# Patient Record
Sex: FEMALE | Race: WHITE | NOT HISPANIC OR LATINO | Employment: FULL TIME | URBAN - METROPOLITAN AREA
[De-identification: names, ages, dates, MRNs, and addresses within clinical notes are randomized per-mention and may not be internally consistent; named-entity substitution may affect disease eponyms.]

---

## 2017-09-01 ENCOUNTER — LAB (OUTPATIENT)
Dept: LAB | Facility: CLINIC | Age: 27
End: 2017-09-01
Payer: COMMERCIAL

## 2017-09-01 ENCOUNTER — TRANSCRIBE ORDERS (OUTPATIENT)
Dept: LAB | Facility: CLINIC | Age: 27
End: 2017-09-01

## 2017-09-01 ENCOUNTER — ALLSCRIPTS OFFICE VISIT (OUTPATIENT)
Dept: OTHER | Facility: OTHER | Age: 27
End: 2017-09-01

## 2017-09-01 DIAGNOSIS — S00.86XA NONVENOMOUS INSECT BITE OF FACE WITHOUT INFECTION, INITIAL ENCOUNTER: Primary | ICD-10-CM
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
## 2017-09-01 DIAGNOSIS — S00.86XA NONVENOMOUS INSECT BITE OF FACE WITHOUT INFECTION, INITIAL ENCOUNTER: ICD-10-CM
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
## 2017-09-01 PROCEDURE — G0145 SCR C/V CYTO,THINLAYER,RESCR: HCPCS | Performed by: NURSE PRACTITIONER

## 2017-09-01 PROCEDURE — 36415 COLL VENOUS BLD VENIPUNCTURE: CPT

## 2017-09-06 ENCOUNTER — LAB REQUISITION (OUTPATIENT)
Dept: LAB | Facility: HOSPITAL | Age: 27
End: 2017-09-06
Payer: COMMERCIAL

## 2017-09-06 DIAGNOSIS — Z01.411 ENCOUNTER FOR GYNECOLOGICAL EXAMINATION WITH ABNORMAL FINDING: ICD-10-CM

## 2017-09-12 LAB
LAB AP GYN PRIMARY INTERPRETATION: NORMAL
LAB AP LMP: NORMAL
Lab: NORMAL

## 2017-09-13 ENCOUNTER — GENERIC CONVERSION - ENCOUNTER (OUTPATIENT)
Dept: OTHER | Facility: OTHER | Age: 27
End: 2017-09-13

## 2017-10-07 LAB — MISCELLANEOUS LAB TEST RESULT: NORMAL

## 2017-10-09 ENCOUNTER — GENERIC CONVERSION - ENCOUNTER (OUTPATIENT)
Dept: OTHER | Facility: OTHER | Age: 27
End: 2017-10-09

## 2017-10-09 ENCOUNTER — APPOINTMENT (OUTPATIENT)
Dept: LAB | Facility: CLINIC | Age: 27
End: 2017-10-09
Payer: COMMERCIAL

## 2017-10-09 ENCOUNTER — ALLSCRIPTS OFFICE VISIT (OUTPATIENT)
Dept: OTHER | Facility: OTHER | Age: 27
End: 2017-10-09

## 2017-10-09 DIAGNOSIS — Z13.6 ENCOUNTER FOR SCREENING FOR CARDIOVASCULAR DISORDERS: ICD-10-CM

## 2017-10-09 DIAGNOSIS — R53.83 OTHER FATIGUE: ICD-10-CM

## 2017-10-09 LAB
ALBUMIN SERPL BCP-MCNC: 4.3 G/DL (ref 3.5–5)
ALP SERPL-CCNC: 40 U/L (ref 46–116)
ALT SERPL W P-5'-P-CCNC: 18 U/L (ref 12–78)
ANION GAP SERPL CALCULATED.3IONS-SCNC: 4 MMOL/L (ref 4–13)
AST SERPL W P-5'-P-CCNC: 15 U/L (ref 5–45)
BASOPHILS # BLD AUTO: 0.01 THOUSANDS/ΜL (ref 0–0.1)
BASOPHILS NFR BLD AUTO: 0 % (ref 0–1)
BILIRUB SERPL-MCNC: 1.38 MG/DL (ref 0.2–1)
BUN SERPL-MCNC: 15 MG/DL (ref 5–25)
CALCIUM SERPL-MCNC: 9.2 MG/DL (ref 8.3–10.1)
CHLORIDE SERPL-SCNC: 106 MMOL/L (ref 100–108)
CHOLEST SERPL-MCNC: 135 MG/DL (ref 50–200)
CO2 SERPL-SCNC: 30 MMOL/L (ref 21–32)
CREAT SERPL-MCNC: 0.85 MG/DL (ref 0.6–1.3)
EOSINOPHIL # BLD AUTO: 0.15 THOUSAND/ΜL (ref 0–0.61)
EOSINOPHIL NFR BLD AUTO: 4 % (ref 0–6)
ERYTHROCYTE [DISTWIDTH] IN BLOOD BY AUTOMATED COUNT: 12.4 % (ref 11.6–15.1)
GFR SERPL CREATININE-BSD FRML MDRD: 93 ML/MIN/1.73SQ M
GLUCOSE P FAST SERPL-MCNC: 78 MG/DL (ref 65–99)
HCT VFR BLD AUTO: 45 % (ref 34.8–46.1)
HDLC SERPL-MCNC: 56 MG/DL (ref 40–60)
HGB BLD-MCNC: 14.6 G/DL (ref 11.5–15.4)
LDLC SERPL CALC-MCNC: 66 MG/DL (ref 0–100)
LYMPHOCYTES # BLD AUTO: 1.51 THOUSANDS/ΜL (ref 0.6–4.47)
LYMPHOCYTES NFR BLD AUTO: 36 % (ref 14–44)
MCH RBC QN AUTO: 31.1 PG (ref 26.8–34.3)
MCHC RBC AUTO-ENTMCNC: 32.4 G/DL (ref 31.4–37.4)
MCV RBC AUTO: 96 FL (ref 82–98)
MONOCYTES # BLD AUTO: 0.32 THOUSAND/ΜL (ref 0.17–1.22)
MONOCYTES NFR BLD AUTO: 8 % (ref 4–12)
NEUTROPHILS # BLD AUTO: 2.19 THOUSANDS/ΜL (ref 1.85–7.62)
NEUTS SEG NFR BLD AUTO: 52 % (ref 43–75)
NRBC BLD AUTO-RTO: 0 /100 WBCS
PLATELET # BLD AUTO: 313 THOUSANDS/UL (ref 149–390)
PMV BLD AUTO: 10.7 FL (ref 8.9–12.7)
POTASSIUM SERPL-SCNC: 4.2 MMOL/L (ref 3.5–5.3)
PROT SERPL-MCNC: 7.9 G/DL (ref 6.4–8.2)
RBC # BLD AUTO: 4.69 MILLION/UL (ref 3.81–5.12)
SODIUM SERPL-SCNC: 140 MMOL/L (ref 136–145)
TRIGL SERPL-MCNC: 66 MG/DL
TSH SERPL DL<=0.05 MIU/L-ACNC: 1.06 UIU/ML (ref 0.36–3.74)
WBC # BLD AUTO: 4.19 THOUSAND/UL (ref 4.31–10.16)

## 2017-10-09 PROCEDURE — 80061 LIPID PANEL: CPT

## 2017-10-09 PROCEDURE — 80053 COMPREHEN METABOLIC PANEL: CPT

## 2017-10-09 PROCEDURE — 85025 COMPLETE CBC W/AUTO DIFF WBC: CPT

## 2017-10-09 PROCEDURE — 36415 COLL VENOUS BLD VENIPUNCTURE: CPT

## 2017-10-09 PROCEDURE — 84443 ASSAY THYROID STIM HORMONE: CPT

## 2017-10-10 NOTE — PROGRESS NOTES
Assessment  1  Palpitations (785 1) (R00 2)   2  Overweight (278 02) (E66 3)   3  Fatigue (780 79) (R53 83)   4  Screening for cardiovascular condition (V81 2) (Z13 6)    Plan  Fatigue    · (1) CBC/PLT/DIFF; Status:Active; Requested EDH:[id number];    · (1) COMPREHENSIVE METABOLIC PANEL; Status:Active; Requested AZD:[id number];    · (1) TSH; Status:Active; Requested SBD:24RYJ2861; Overweight    · Keep a diary of when and what you eat ; Status:Complete;   Done: 07JYO1946   · Some eating tips that can help you lose weight ; Status:Complete;   Done: 71FJG6760   · We recommend that you bring your body mass index down to 26 ; Status:Complete;    Done: 91MCY8142  Screening for cardiovascular condition    · (1) LIPID PANEL, FASTING; Status:Active; Requested EAD:72ZVO9523;     Discussion/Summary  Discussion Summary:   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 or stressed at work, she has palpitations  She has never taken her pulse rate or blood pressure at that time  I told her to do so  She was also told to cut down on the caffeine intake and to try to relax  If her symptoms persist, she may need an EKG or cardiology consult has not had a doctor visit for the last 3 years  I will check a CBC and TSH level  was told to try to watch her diet and increase her activity  is planning her next pregnancy and wanted to know if everything is okay  I will get back to her with the results of the blood work  reviewed the old records  Counseling Documentation With Imm: The patient was counseled regarding diagnostic results,-instructions for management,-risks and benefits of treatment options,-importance of compliance with treatment  Chief Complaint  Chief Complaint Free Text Note Form: establish care      History of Present Illness  Obesity (Follow-Up): The patient is being seen for follow-up of overweight  The patient reports no change in the condition   She has had no significant interval events  The patient is currently asymptomatic  The patient is not currently on medication for this problem  Fatigue: The patient is being seen for an initial evaluation of fatigue  Symptoms:  fatigue  No associated symptoms are reported  The patient is not currently being treated for this problem  Palpitations:   Jason Joya presents with complaints of gradual onset of occasional episodes of moderate palpitations  Episodes about months ago  Symptoms are improved by stress management, adequate rest and caffeine avoidance  Symptoms are made worse by emotional stress, fatigue and caffeine  Associated symptoms include rapid heartbeat-and-a pounding heartbeat  Review of Systems  Complete-Female:   Constitutional: No fever, no chills, feels well, no tiredness, no recent weight gain or weight loss  Eyes: No complaints of eye pain, no red eyes, no eyesight problems, no discharge, no dry eyes, no itching of eyes  ENT: no complaints of earache, no loss of hearing, no nose bleeds, no nasal discharge, no sore throat, no hoarseness  Cardiovascular: palpitations  Respiratory: No complaints of shortness of breath, no wheezing, no cough, no SOB on exertion, no orthopnea, no PND  Gastrointestinal: No complaints of abdominal pain, no constipation, no nausea or vomiting, no diarrhea, no bloody stools  Genitourinary: No complaints of dysuria, no incontinence, no pelvic pain, no dysmenorrhea, no vaginal discharge or bleeding  Musculoskeletal: No complaints of arthralgias, no myalgias, no joint swelling or stiffness, no limb pain or swelling  Integumentary: No complaints of skin rash or lesions, no itching, no skin wounds, no breast pain or lump  Neurological: No complaints of headache, no confusion, no convulsions, no numbness, no dizziness or fainting, no tingling, no limb weakness, no difficulty walking     Psychiatric: Not suicidal, no sleep disturbance, no anxiety or depression, no change in personality, no emotional problems  Endocrine: No complaints of proptosis, no hot flashes, no muscle weakness, no deepening of the voice, no feelings of weakness  Hematologic/Lymphatic: No complaints of swollen glands, no swollen glands in the neck, does not bleed easily, does not bruise easily  Active Problems  1  Encounter for gynecological examination with abnormal finding (V72 31) (Z01 411)    Past Medical History  1  History of Mosquito bite (919 4,E906 4) [facility])  Active Problems And Past Medical History Reviewed: The active problems and past medical history were reviewed and updated today  Surgical History  1  History of  Section  Surgical History Reviewed: The surgical history was reviewed and updated today  Family History  Father    1  Family history of malignant neoplasm (V16 9) (Z80 9)  Paternal Grandmother    2  Family history of arthritis (V17 7) (Z82 61)   3  Family history of diabetes mellitus (V18 0) (Z83 3)   4  Family history of hypertension (V17 49) (Z82 49)   5  Family history of respiratory disorder (V17 6) (Z83 6)   6  Family history of urinary tract infection (V18 7) (Z84 2)   7  Family history of varicose veins (V17 49) (Z82 49)  Paternal Grandfather    8  Family history of hypertension (V17 49) (Z82 49)  Family History Reviewed: The family history was reviewed and updated today  Social History   · Currently sexually active   · Former smoker (R29 84) (C91 432)   · No alcohol use  Social History Reviewed: The social history was reviewed and updated today  The social history was reviewed and is unchanged  Current Meds   1  Prenatal 28-0 8 MG Oral Tablet; TAKE 1 TABLET DAILY; Therapy: 69WDF9283 to Recorded    Allergies  1   No Known Drug Allergies    Vitals  Signs   Recorded: 60LSG1636 09:53AM   Heart Rate: 84  Respiration: 16  Systolic: 225  Diastolic: 78  Height: 5 ft 4 5 in  Weight: 162 lb   BMI Calculated: 27 38  BSA Calculated: 1 8    Physical Exam    Constitutional   General appearance: Abnormal   overweight  Head and Face   Head and face: Normal     Palpation of the face and sinuses: No sinus tenderness  Eyes   Conjunctiva and lids: No swelling, erythema or discharge  Pupils and irises: Equal, round, reactive to light  Ears, Nose, Mouth, and Throat   External inspection of ears and nose: Normal     Otoscopic examination: Tympanic membranes translucent with normal light reflex  Canals patent without erythema  Oropharynx: Normal with no erythema, edema, exudate or lesions  Neck   Neck: Supple, symmetric, trachea midline, no masses  Thyroid: Abnormal   The thyroid was diffusely enlarged  Pulmonary   Respiratory effort: No increased work of breathing or signs of respiratory distress  Auscultation of lungs: Clear to auscultation  Cardiovascular   Palpation of heart: Normal PMI, no thrills  Auscultation of heart: Normal rate and rhythm, normal S1 and S2, no murmurs  Examination of extremities for edema and/or varicosities: Normal     Abdomen   Abdomen: Non-tender, no masses  Liver and spleen: No hepatomegaly or splenomegaly  Lymphatic   Palpation of lymph nodes in neck: No lymphadenopathy  Musculoskeletal   Gait and station: Normal     Skin   Skin and subcutaneous tissue: Normal without rashes or lesions  Neurologic   Cranial nerves: Cranial nerves II-XII intact  Cortical function: Normal mental status  Reflexes: 2+ and symmetric  Sensation: No sensory loss      Psychiatric   Judgment and insight: Normal     Mood and affect: Normal        Future Appointments    Date/Time Provider Specialty Site   09/07/2018 04:00 PM LINDA Varner Obstetrics/Gynecology [facility]     Signatures   Electronically signed by : CECILIA Triana ; Oct  9 2017 10:25AM EST                       (Author)

## 2018-01-12 ENCOUNTER — APPOINTMENT (OUTPATIENT)
Dept: LAB | Facility: CLINIC | Age: 28
End: 2018-01-12
Payer: COMMERCIAL

## 2018-01-12 ENCOUNTER — ALLSCRIPTS OFFICE VISIT (OUTPATIENT)
Dept: OTHER | Facility: OTHER | Age: 28
End: 2018-01-12

## 2018-01-12 VITALS
WEIGHT: 168 LBS | HEIGHT: 65 IN | SYSTOLIC BLOOD PRESSURE: 120 MMHG | BODY MASS INDEX: 27.99 KG/M2 | DIASTOLIC BLOOD PRESSURE: 80 MMHG

## 2018-01-12 DIAGNOSIS — Z34.90 ENCOUNTER FOR SUPERVISION OF NORMAL PREGNANCY: ICD-10-CM

## 2018-01-12 LAB
BACTERIA UR QL AUTO: ABNORMAL /HPF
BASOPHILS # BLD AUTO: 0.01 THOUSANDS/ΜL (ref 0–0.1)
BASOPHILS NFR BLD AUTO: 0 % (ref 0–1)
BILIRUB UR QL STRIP: ABNORMAL
CAOX CRY URNS QL MICRO: ABNORMAL /HPF
CLARITY UR: ABNORMAL
COLOR UR: YELLOW
EOSINOPHIL # BLD AUTO: 0.09 THOUSAND/ΜL (ref 0–0.61)
EOSINOPHIL NFR BLD AUTO: 1 % (ref 0–6)
ERYTHROCYTE [DISTWIDTH] IN BLOOD BY AUTOMATED COUNT: 12.7 % (ref 11.6–15.1)
GLUCOSE UR STRIP-MCNC: NEGATIVE MG/DL
HCT VFR BLD AUTO: 43.2 % (ref 34.8–46.1)
HGB BLD-MCNC: 14.7 G/DL (ref 11.5–15.4)
HGB UR QL STRIP.AUTO: NEGATIVE
KETONES UR STRIP-MCNC: ABNORMAL MG/DL
LEUKOCYTE ESTERASE UR QL STRIP: NEGATIVE
LYMPHOCYTES # BLD AUTO: 1.63 THOUSANDS/ΜL (ref 0.6–4.47)
LYMPHOCYTES NFR BLD AUTO: 20 % (ref 14–44)
MCH RBC QN AUTO: 31.2 PG (ref 26.8–34.3)
MCHC RBC AUTO-ENTMCNC: 34 G/DL (ref 31.4–37.4)
MCV RBC AUTO: 92 FL (ref 82–98)
MONOCYTES # BLD AUTO: 0.56 THOUSAND/ΜL (ref 0.17–1.22)
MONOCYTES NFR BLD AUTO: 7 % (ref 4–12)
MUCOUS THREADS UR QL AUTO: ABNORMAL
NEUTROPHILS # BLD AUTO: 6 THOUSANDS/ΜL (ref 1.85–7.62)
NEUTS SEG NFR BLD AUTO: 72 % (ref 43–75)
NITRITE UR QL STRIP: NEGATIVE
NON-SQ EPI CELLS URNS QL MICRO: ABNORMAL /HPF
NRBC BLD AUTO-RTO: 0 /100 WBCS
PH UR STRIP.AUTO: 5.5 [PH] (ref 4.5–8)
PLATELET # BLD AUTO: 321 THOUSANDS/UL (ref 149–390)
PMV BLD AUTO: 10.4 FL (ref 8.9–12.7)
PROT UR STRIP-MCNC: NEGATIVE MG/DL
RBC # BLD AUTO: 4.71 MILLION/UL (ref 3.81–5.12)
RBC #/AREA URNS AUTO: ABNORMAL /HPF
SP GR UR STRIP.AUTO: 1.03 (ref 1–1.03)
UROBILINOGEN UR QL STRIP.AUTO: 0.2 E.U./DL
WBC # BLD AUTO: 8.32 THOUSAND/UL (ref 4.31–10.16)
WBC #/AREA URNS AUTO: ABNORMAL /HPF

## 2018-01-12 PROCEDURE — 87086 URINE CULTURE/COLONY COUNT: CPT

## 2018-01-12 PROCEDURE — 81001 URINALYSIS AUTO W/SCOPE: CPT

## 2018-01-12 PROCEDURE — 36415 COLL VENOUS BLD VENIPUNCTURE: CPT

## 2018-01-12 PROCEDURE — 80081 OBSTETRIC PANEL INC HIV TSTG: CPT

## 2018-01-12 NOTE — MISCELLANEOUS
Message   Recorded as Task   Date: 09/13/2017 09:50 AM, Created By: Radha García   Task Name: Care Coordination   Assigned To: [facility],Team   Regarding Patient: Brenda Joseph, Status: In Progress   Comment:    Jolie Camacho - 13 Sep 2017 9:50 AM     TASK CREATED  Caller: Dept  of Health, Consultant; Care Coordination  Department of Health called in regards to [address] not being covered for fatigue  The CDC recommendation is 6 months abstinence and condoms  Will need to ask if she is having any achy joints, rash, conjunctivitis, or sick  Did her  travel with her? Will need to verify with patient  LM to call and ask for OB nurse  I can then fill out paperwork for patient  Testing can cost out of pocket between $200-800 00  Will ask patient if she wants to proceed with testing  Jolie Camacho - 13 Sep 2017 9:59 AM     TASK IN PROGRESS   Jolie Camacho - 13 Sep 2017 12:38 PM     TASK EDITED  Please call department  health and let them know to get rid of Zika specimen  Patient does not want it done and does not want a bill  Mic Reyna - 13 Sep 2017 12:44 PM     TASK EDITED  Will abstain or use condoms x 2 months then try again  She has been trying and I told her to call with + HPT  We can talk about risks at that point  Please put this in note once you call  [facility] - 13 Sep 2017 12:45 PM     TASK IN PROGRESS   [place] [address] Sep 2017 12:51 PM     TASK EDITED  If sample is accepted by department of health for [address] testing it will be free of charge to the pt  They will receive charge if lab is run at independent lab  Active Problems    1  Encounter for gynecological examination with abnormal finding (V72 31) (Z01 411)   2  Mosquito bite (919 4,E906 4) (W57 XXXA)    Current Meds   1  No Reported Medications Recorded    Allergies    1   No Known Drug Allergies    Signatures   Electronically signed by : Herve Lam, ; Sep 13 2017 12:52PM EST (Author)

## 2018-01-13 ENCOUNTER — LAB REQUISITION (OUTPATIENT)
Dept: LAB | Facility: HOSPITAL | Age: 28
End: 2018-01-13
Payer: COMMERCIAL

## 2018-01-13 VITALS
WEIGHT: 162 LBS | HEART RATE: 84 BPM | RESPIRATION RATE: 16 BRPM | SYSTOLIC BLOOD PRESSURE: 110 MMHG | DIASTOLIC BLOOD PRESSURE: 78 MMHG | HEIGHT: 65 IN | BODY MASS INDEX: 26.99 KG/M2

## 2018-01-13 DIAGNOSIS — Z34.90 ENCOUNTER FOR SUPERVISION OF NORMAL PREGNANCY: ICD-10-CM

## 2018-01-13 LAB
BACTERIA UR CULT: NORMAL
BLD GP AB SCN SERPL QL: NEGATIVE
HBV SURFACE AG SER QL: NORMAL
RUBV IGG SERPL IA-ACNC: 96.1 IU/ML
SPECIMEN EXPIRATION DATE: NORMAL

## 2018-01-13 NOTE — PROGRESS NOTES
Assessment   1  Encounter to determine fetal viability of pregnancy (V23 87) (O36 80X0)    Plan   Unlinked    · Prenatal 28-0 8 MG Oral Tablet; TAKE 1 TABLET DAILY   Dispense: 0 Days ; #: Sufficient Tablet; Refill: 0; EDIE = N; Record; Last Updated By: Romaine Sellerson; 10/9/2017 9:59:56 AM    Discussion/Summary   Discussion Summary:    FRANCISCA 18 (LMP) by ultrasound- FRANCISCA by LMP to be used        Goals and Barriers: The patient has the current Goals: Healthy pregnancy  The patent has the current Barriers: None  Patient's Capacity to Self-Care: Patient is able to Self-Care  Patient Education: Educational resources provided: Food and nutrition during pregnancy  Medication SE Review and Pt Understands Tx: Possible side effects of new medications were reviewed with the patient/guardian today  The treatment plan was reviewed with the patient/guardian  The patient/guardian understands and agrees with the treatment plan    Self Referrals:    Self Referrals: No      Chief Complaint   Chief Complaint Free Text Note Form: Patient here today for Ultrasound  Doing well  LMP 17 = FRANCISCA 18      Active Problems   1  Fatigue (780 79) (R53 83)   2  Overweight (278 02) (E66 3)   3  Palpitations (785 1) (R00 2)   4  Screening for cardiovascular condition (V81 2) (Z13 6)    Past Medical History   1  History of Mosquito bite (919 4,E906 4) [facility])    Surgical History   1  History of  Section    Family History   Father    1  Family history of malignant neoplasm (V16 9) (Z80 9)  Paternal Grandmother    2  Family history of arthritis (V17 7) (Z82 61)   3  Family history of diabetes mellitus (V18 0) (Z83 3)   4  Family history of hypertension (V17 49) (Z82 49)   5  Family history of respiratory disorder (V17 6) (Z83 6)   6  Family history of urinary tract infection (V18 7) (Z84 2)   7  Family history of varicose veins (V17 49) (Z82 49)  Paternal Grandfather    8   Family history of hypertension (V17 49) (Z82 49)    Social History    · Currently sexually active   · Former smoker (V15 82) (B21 285)   · No alcohol use    Current Meds    1  Prenatal 28-0 8 MG Oral Tablet; TAKE 1 TABLET DAILY; Therapy: 37LBK6836 to Recorded    Allergies   1  No Known Drug Allergies    Vitals   Vital Signs    Recorded: 86UAO9911 44:53DN   Systolic 106   Diastolic 62   Height 5 ft 4 5 in   Weight 169 lb    BMI Calculated 28 56   BSA Calculated 1 83   LMP 10JNQ7056     Results/Data   [address] Transvaginal Ultrasound OB [facility]:      Procedure: [id number] pregnant uterus real time with image documentation, fetal and maternal evaluation, first trimester (<14 weeks 0 days), transvaginal approach: single or first gestation  Indication: EDC gestational age 8w 3 weeks  Exam indication: viability  Findings:      Number of gestational sacs and fetuses: 1  Gestational sac/fetal measurements appropriate for gestation: CRL 4 19cm 11w1d 8/2/18  Survey of visible fetal and placental anatomic structure FHt 161 fetal movement seen all extremities  Qualitative assessment of amniotic fluid volume/gestational sac shape: nl appearing  Exam of maternal uterus and adnexa: nl appearing  Impression: viable IUP agrees with LMP FRANCISCA        Future Appointments      Date/Time Provider Specialty Site   09/07/2018 04:00 PM LINDA Brown Obstetrics/Gynecology [facility]     Signatures    Electronically signed by : Paul Barriga MD; Jan 12 2018  2:36PM EST                       (Author)

## 2018-01-14 NOTE — RESULT NOTES
Verified Results  (1) THIN PREP PAP WITH IMAGING 25Nxw2104 11:23AM Keri Uribe     Test Name Result Flag Reference   LAB AP CASE REPORT (Report)     Gynecologic Cytology Report            Case: [id number]                  Authorizing Provider: LINDA Rueda    Collected:      09/01/2017           First Screen:     LAURIE Schulz    Received:      09/07/2017 0750        Specimen:  LIQUID-BASED PAP, SCREENING, Cervix   LAB AP GYN PRIMARY INTERPRETATION      Negative for intraepithelial lesion or malignancy  Electronically signed by LAURIE Schulz on 9/12/2017 at 11:23 AM   LAB AP GYN SPECIMEN ADEQUACY      Satisfactory for evaluation  Endocervical/transformation zone component present  LAB AP GYN ADDITIONAL INFORMATION (Report)     [employer]'s FDA approved ,  and ThinPrep Imaging System are   utilized with strict adherence to the 's instruction manual to   prepare gynecologic and non-gynecologic cytology specimens for the   production of ThinPrep slides as well as for gynecologic ThinPrep imaging  These processes have been validated by our laboratory and/or by the     The Pap test is not a diagnostic procedure and should not be used as the   sole means to detect cervical cancer  It is only a screening procedure to   aid in the detection of cervical cancer and its precursors  Both   false-negative and false-positive results have been experienced  Your   patient's test result should be interpreted in this context together with   the history and clinical findings     LAB AP LMP 8/5/2017

## 2018-01-15 ENCOUNTER — ALLSCRIPTS OFFICE VISIT (OUTPATIENT)
Dept: OTHER | Facility: OTHER | Age: 28
End: 2018-01-15

## 2018-01-15 ENCOUNTER — LAB REQUISITION (OUTPATIENT)
Dept: LAB | Facility: HOSPITAL | Age: 28
End: 2018-01-15
Payer: COMMERCIAL

## 2018-01-15 DIAGNOSIS — Z34.90 ENCOUNTER FOR SUPERVISION OF NORMAL PREGNANCY: ICD-10-CM

## 2018-01-15 LAB
ABO GROUP BLD: NORMAL
HIV 1+2 AB+HIV1 P24 AG SERPL QL IA: NORMAL
RH BLD: POSITIVE
RPR SER QL: NORMAL

## 2018-01-15 PROCEDURE — 86901 BLOOD TYPING SEROLOGIC RH(D): CPT | Performed by: OBSTETRICS & GYNECOLOGY

## 2018-01-15 PROCEDURE — 86900 BLOOD TYPING SEROLOGIC ABO: CPT | Performed by: OBSTETRICS & GYNECOLOGY

## 2018-01-16 NOTE — MISCELLANEOUS
Message   Recorded as Task   Date: 10/09/2017 12:51 PM, Created By: Moises Garcia   Task Name: Follow Up   Assigned To: Demond Marti   Regarding Patient: Brenda Joseph, Status: In Progress   Comment:    Yudi Ovalle - 09 Oct 2017 12:51 PM     TASK CREATED  Please notify patient the Department of Health did not improve her Zika testing So it was not processed,thx   Karla Renteria - 09 Oct 2017 1:47 PM     TASK IN PROGRESS   Karla Renteria - 09 Oct 2017 1:56 PM     TASK EDITED  MA called Pt today @ [phone], left voicemail message per Pt's communication consent form signed by Pt on 8/11/17  MA informed Pt, via voicemail message, that [facility] did not josh approval for Pt's [employer] testing therefore request was not processed  MA reiterated to Pt that if Pt has any questions/concerns, to contact office  Active Problems    1  Encounter for gynecological examination with abnormal finding (V72 31) (Z01 411)   2  Fatigue (780 79) (R53 83)   3  Overweight (278 02) (E66 3)   4  Palpitations (785 1) (R00 2)   5  Screening for cardiovascular condition (V81 2) (Z13 6)    Current Meds   1  Prenatal 28-0 8 MG Oral Tablet; TAKE 1 TABLET DAILY; Therapy: 27GNE5846 to Recorded    Allergies    1   No Known Drug Allergies    Signatures   Electronically signed by : Sandra Pritchett MA; Oct  9 2017  1:56PM EST                       (Author)

## 2018-01-22 VITALS
BODY MASS INDEX: 28.16 KG/M2 | WEIGHT: 169 LBS | HEIGHT: 65 IN | DIASTOLIC BLOOD PRESSURE: 62 MMHG | SYSTOLIC BLOOD PRESSURE: 118 MMHG

## 2018-01-23 ENCOUNTER — OB ABSTRACT (OUTPATIENT)
Dept: OBGYN CLINIC | Facility: MEDICAL CENTER | Age: 28
End: 2018-01-23

## 2018-01-23 VITALS
SYSTOLIC BLOOD PRESSURE: 102 MMHG | WEIGHT: 173.6 LBS | HEIGHT: 65 IN | BODY MASS INDEX: 28.92 KG/M2 | DIASTOLIC BLOOD PRESSURE: 68 MMHG

## 2018-01-26 ENCOUNTER — ROUTINE PRENATAL (OUTPATIENT)
Dept: OBGYN CLINIC | Facility: MEDICAL CENTER | Age: 28
End: 2018-01-26

## 2018-01-26 VITALS — SYSTOLIC BLOOD PRESSURE: 100 MMHG | WEIGHT: 174 LBS | DIASTOLIC BLOOD PRESSURE: 60 MMHG | BODY MASS INDEX: 29.41 KG/M2

## 2018-01-26 DIAGNOSIS — Z34.80 ENCOUNTER FOR SUPERVISION OF NORMAL PREGNANCY IN MULTIGRAVIDA: ICD-10-CM

## 2018-01-26 PROBLEM — E66.3 OVERWEIGHT: Status: ACTIVE | Noted: 2017-10-09

## 2018-01-26 PROBLEM — R53.83 FATIGUE: Status: ACTIVE | Noted: 2017-10-09

## 2018-01-26 PROBLEM — R00.2 PALPITATIONS: Status: ACTIVE | Noted: 2017-10-09

## 2018-01-26 PROCEDURE — 87591 N.GONORRHOEAE DNA AMP PROB: CPT | Performed by: NURSE PRACTITIONER

## 2018-01-26 PROCEDURE — PNV: Performed by: NURSE PRACTITIONER

## 2018-01-26 PROCEDURE — 87491 CHLMYD TRACH DNA AMP PROBE: CPT | Performed by: NURSE PRACTITIONER

## 2018-01-26 RX ORDER — PNV NO.95/FERROUS FUM/FOLIC AC 28MG-0.8MG
1 TABLET ORAL DAILY
COMMUNITY
Start: 2017-10-09 | End: 2019-03-16

## 2018-01-26 NOTE — PROGRESS NOTES
Subjective     Elsa Dias is being seen today for her first obstetrical visit  This is a planned pregnancy  She is at 12w2d gestation  Her obstetrical history is significant for gestational hypertension and  section delivery  Unable to fully review pregnancy history due to records from [facility] not available at this time, will have patient sign MRR today so that we can obtain these records  Feeling well, denies vaginal bleeding  OB History      Para Term  AB Living    2 1 1     1    SAB TAB Ectopic Multiple Live Births            1          Current Outpatient Prescriptions:     Prenatal Vit-Fe Fumarate-FA (PRENATAL VITAMIN) 27-0 8 MG TABS, Take 1 tablet by mouth daily, Disp: , Rfl:     No Known Allergies    Patient's last menstrual period was 2017  The following portions of the patient's history were reviewed and updated as appropriate: allergies, current medications, past family history, past medical history, past social history, past surgical history and problem list     Review of Systems  Pertinent items are noted in HPI        Objective     /60   Wt 78 9 kg (174 lb)   LMP 2017   BMI 29 41 kg/m²     General Appearance:    Alert, cooperative, no distress, appears stated age   Head:    Normocephalic, without obvious abnormality, atraumatic   Neck:   Supple, symmetrical, trachea midline, no adenopathy;     thyroid:  no enlargement/tenderness/nodules   Lungs:     Clear to auscultation bilaterally, respirations unlabored   Breast Exam:    Not performed   Abdomen:     Soft, non-tender, no masses, no organomegaly   Genitalia:    Normal female without lesion, discharge or tenderness   Extremities:   Extremities normal, atraumatic, no cyanosis or edema   Skin:   Skin color, texture, turgor normal, no rashes or lesions            Assessment     Pregnancy at 12 and 2/7 weeks      Plan    Initial prenatal labs reviewed, normal  Ch/GC done today  Pap smear UTD  Continue prenatal vitamins  Problem list reviewed and updated  Genetic screening options discussed: DECLINED  Role of ultrasound in pregnancy discussed; fetal survey: ordered  PMRs from [facility] to see if baseline preE labs are needed  Follow up in 4 weeks        [facility]

## 2018-01-26 NOTE — PATIENT INSTRUCTIONS
Pregnancy at 11 to 14 Weeks   AMBULATORY CARE:   What changes are happening to your body: You are now at the end of your first trimester and entering your second trimester  Morning sickness usually goes away by this time  You may have other symptoms such as fatigue, frequent urination, and headaches  You may have gained between 2 to 4 pounds by now  Seek care immediately if:   · You have pain or cramping in your abdomen or low back  · You have heavy vaginal bleeding or clotting  · You pass material that looks like tissue or large clots  Collect the material and bring it with you  Contact your healthcare provider if:   · You cannot keep food or drinks down, and you are losing weight  · You have light bleeding  · You have chills or a fever  · You have vaginal itching, burning, or pain  · You have yellow, green, white, or foul-smelling vaginal discharge  · You have pain or burning when you urinate, less urine than usual, or pink or bloody urine  · You have questions or concerns about your condition or care  How to care for yourself at this stage of your pregnancy:   · Get plenty of rest   You may feel more tired than normal  You may need to take naps or go to bed earlier  · Manage nausea and vomiting  Avoid fatty and spicy foods  Eat small meals throughout the day instead of large meals  Deena may help to decrease nausea  Ask your healthcare provider about other ways of decreasing nausea and vomiting  · Eat a variety of healthy foods  Healthy foods include fruits, vegetables, whole-grain breads, low-fat dairy foods, beans, lean meats, and fish  Drink liquids as directed  Ask how much liquid to drink each day and which liquids are best for you  Limit caffeine to less than 200 milligrams each day  Limit your intake of fish to 2 servings each week  Choose fish low in mercury such as canned light tuna, shrimp, salmon, cod, or tilapia   Do not  eat fish high in mercury such as swordfish, tilefish, rashaad mackerel, and shark  · Take prenatal vitamins as directed  Your need for certain vitamins and minerals, such as folic acid, increases during pregnancy  Prenatal vitamins provide some of the extra vitamins and minerals you need  Prenatal vitamins may also help to decrease the risk of certain birth defects  · Do not smoke  If you smoke, it is never too late to quit  Smoking increases your risk of a miscarriage and other health problems during your pregnancy  Smoking can cause your baby to be born too early or weigh less at birth  Ask your healthcare provider for information if you need help quitting  · Do not drink alcohol  Alcohol passes from your body to your baby through the placenta  It can affect your baby's brain development and cause fetal alcohol syndrome (FAS)  FAS is a group of conditions that causes mental, behavior, and growth problems  · Talk to your healthcare provider before you take any medicines  Many medicines may harm your baby if you take them when you are pregnant  Do not take any medicines, vitamins, herbs, or supplements without first talking to your healthcare provider  Never use illegal or street drugs (such as marijuana or cocaine) while you are pregnant  Safety tips during pregnancy:   · Avoid hot tubs and saunas  Do not use a hot tub or sauna while you are pregnant, especially during your first trimester  Hot tubs and saunas may raise your baby's temperature and increase the risk of birth defects  · Avoid toxoplasmosis  This is an infection caused by eating raw meat or being around infected cat feces  It can cause birth defects, miscarriages, and other problems  Wash your hands after you touch raw meat  Make sure any meat is well-cooked before you eat it  Avoid raw eggs and unpasteurized milk  Use gloves or ask someone else to clean your cat's litter box while you are pregnant  Changes that are happening with your baby:   Your baby has fully formed fingernails and toenails  Your baby's heartbeat can now be heard  Ask your healthcare provider if you can listen to your baby's heartbeat  By week 14, your baby is over 4 inches long from the top of the head to the rump (baby's bottom)  Your baby weighs over 3 ounces  What you need to know about prenatal care:  During the first 28 weeks of your pregnancy, you will see your healthcare provider once a month  Prenatal care can help prevent problems during pregnancy and childbirth  Your healthcare provider will check your blood pressure and weight  You may also need any of the following:  · A urine test  may also be done to check for sugar and protein  These can be signs of gestational diabetes or infection  · Genetic disorders screening tests  may be offered to you  This screening test checks your baby's risk of genetic disorders such as Down syndrome  The screening test includes a blood test and ultrasound  · Your baby's heart rate  will be checked  © 2017 [employer] Information is for End User's use only and may not be sold, redistributed or otherwise used for commercial purposes  All illustrations and images included in CareNotes® are the copyrighted property of [employer]  or Shilo Rodriguez  The above information is an  only  It is not intended as medical advice for individual conditions or treatments  Talk to your doctor, nurse or pharmacist before following any medical regimen to see if it is safe and effective for you

## 2018-01-31 LAB
CHLAMYDIA DNA CVX QL NAA+PROBE: NORMAL
N GONORRHOEA DNA GENITAL QL NAA+PROBE: NORMAL

## 2018-02-23 ENCOUNTER — ROUTINE PRENATAL (OUTPATIENT)
Dept: OBGYN CLINIC | Facility: CLINIC | Age: 28
End: 2018-02-23
Payer: COMMERCIAL

## 2018-02-23 VITALS — BODY MASS INDEX: 30.59 KG/M2 | SYSTOLIC BLOOD PRESSURE: 120 MMHG | WEIGHT: 181 LBS | DIASTOLIC BLOOD PRESSURE: 80 MMHG

## 2018-02-23 DIAGNOSIS — Z34.82 ENCOUNTER FOR SUPERVISION OF OTHER NORMAL PREGNANCY IN SECOND TRIMESTER: Primary | ICD-10-CM

## 2018-02-23 DIAGNOSIS — Z23 NEED FOR VACCINATION FOR H FLU TYPE B: ICD-10-CM

## 2018-02-23 PROBLEM — Z34.90 SUPERVISION OF NORMAL PREGNANCY: Status: ACTIVE | Noted: 2018-02-23

## 2018-02-23 PROCEDURE — PNV: Performed by: NURSE PRACTITIONER

## 2018-02-23 PROCEDURE — 90471 IMMUNIZATION ADMIN: CPT | Performed by: NURSE PRACTITIONER

## 2018-02-23 PROCEDURE — 90686 IIV4 VACC NO PRSV 0.5 ML IM: CPT

## 2018-02-23 RX ORDER — CALCIUM CARBONATE 200(500)MG
2 TABLET,CHEWABLE ORAL EVERY 4 HOURS PRN
COMMUNITY
End: 2018-07-27 | Stop reason: ALTCHOICE

## 2018-02-23 NOTE — PROGRESS NOTES
Problem List Items Addressed This Visit     Supervision of normal pregnancy - Primary      Other Visit Diagnoses     Need for vaccination for H flu type B            Pt feels great  Flu shot today  Records from [facility] still not here-requested them again  L2 appt on 3/19  No concerns  Opting for repeat csection

## 2018-02-23 NOTE — PATIENT INSTRUCTIONS
Early Labor Signs   WHAT YOU SHOULD KNOW:   Early labor signs are changes in your body that allow your baby to pass through your birth canal   INSTRUCTIONS:   Signs and symptoms of early labor:   · Lightening  occurs when your baby drops inside your pelvis  You may feel increased pressure in your pelvis  This may happen a few weeks to a few hours before your labor begins  · Contractions  are cramps and tightening that occur in your uterus to help move the baby through your birth canal  Contractions occur regularly and more often each time  Each one lasts about 30 to 70 seconds, and gets stronger and more painful until you deliver your baby  Contractions do not go away with movement  They start in your lower back and move to the front in your abdomen  · Effacement  occurs when your cervix softens and thins, so it can easily open for the baby  Your primary healthcare provider [facility] or obstetrician will examine your cervix for effacement  · Dilation  is widening of your cervix, also for the baby's passage  Your PHP or obstetrician will examine your cervix for dilation  Your cervix will be fully opened and ready for delivery when it is dilated to 10 centimeters  · Increased discharge  from your vagina may occur  It may be pink, clear, or slightly bloody  This discharge may also be called bloody show  Bloody show is a mucus plug that forms and blocks your cervix during pregnancy  · Rupture of membranes  is a sudden release of clear fluid from your vagina  It is also known as when your water breaks  Your PHP or obstetrician may need to break your water if it does not break on its own  False labor: You may have false labor signs, which are also called [place] contractions  False labor is common and may happen several weeks or days before your actual labor  The contractions are not regular, and do not get closer together  The pain is usually mild, does not worsen, and is felt only in front   [place] contractions may happen later in the day, and stop after you change position, walk, or rest   Contact your PHP or obstetrician if:   · You have pain in your lower back or abdomen  · You have bloody mucus or show  · You have questions or concerns about your condition or care  Return to the emergency department if:   · You have regular, painful contractions that are less than 5 minutes apart, and last 30 to 70 seconds each  · You have heavy vaginal bleeding  · You have a constant trickle or sudden gush of clear fluid from your vagina  · You notice a sudden decrease in your baby's movement  © 2014 [employer] [employer] is for End User's use only and may not be sold, redistributed or otherwise used for commercial purposes  All illustrations and images included in CareNotes® are the copyrighted property of [employer]  or Shilo Rodriguez  The above information is an  only  It is not intended as medical advice for individual conditions or treatments  Talk to your doctor, nurse or pharmacist before following any medical regimen to see if it is safe and effective for you

## 2018-03-19 ENCOUNTER — ROUTINE PRENATAL (OUTPATIENT)
Dept: OBGYN CLINIC | Facility: MEDICAL CENTER | Age: 28
End: 2018-03-19

## 2018-03-19 VITALS — SYSTOLIC BLOOD PRESSURE: 120 MMHG | DIASTOLIC BLOOD PRESSURE: 70 MMHG | WEIGHT: 186 LBS | BODY MASS INDEX: 31.43 KG/M2

## 2018-03-19 DIAGNOSIS — Z34.82 PRENATAL CARE, SUBSEQUENT PREGNANCY, SECOND TRIMESTER: Primary | ICD-10-CM

## 2018-03-19 DIAGNOSIS — Z98.891 HISTORY OF CESAREAN DELIVERY: ICD-10-CM

## 2018-03-19 PROCEDURE — PNV: Performed by: OBSTETRICS & GYNECOLOGY

## 2018-03-19 NOTE — PROGRESS NOTES
Records from [facility] still not present - will task RN to follow up  Had high blood pressure at time of delivery per patient, but got delivered 39wks  Thought today was her level II US - not actually scheduled  Called [facility] for patient - scheduled on 4/2 in the [place] office  Plans on repeat c/section, briefly discussed scheduling, she will likely prefer female doc, but will continue to meet the providers

## 2018-03-20 ENCOUNTER — TELEPHONE (OUTPATIENT)
Dept: OBGYN CLINIC | Facility: CLINIC | Age: 28
End: 2018-03-20

## 2018-03-20 ENCOUNTER — DOCUMENTATION (OUTPATIENT)
Dept: OBGYN CLINIC | Facility: CLINIC | Age: 28
End: 2018-03-20

## 2018-03-20 NOTE — PROGRESS NOTES
Prior pregnancy records reviewed from her last delivery  Had one elevated BP (138/80) for which they ordered labs - but did NOT meet BP criteria for diagnosis, and rest of BPS WNL        (Was delivered at 39wks for breech)  Does not need baseline PreE labs

## 2018-03-20 NOTE — TELEPHONE ENCOUNTER
----- Message from Verma Spurling, MD sent at 3/19/2018  3:25 PM EDT -----  We have requested this patients records 3 times from [facility] - need these records, will affect current care, unsure if she needs baseline preE labs  Can someone follow up and try to obtain?

## 2018-03-20 NOTE — TELEPHONE ENCOUNTER
Faxed request to [facility] medical records at [phone] for lab results and records from 1/1/18 to present

## 2018-04-03 ENCOUNTER — ROUTINE PRENATAL (OUTPATIENT)
Dept: PERINATAL CARE | Facility: CLINIC | Age: 28
End: 2018-04-03
Payer: COMMERCIAL

## 2018-04-03 VITALS
DIASTOLIC BLOOD PRESSURE: 59 MMHG | WEIGHT: 189.4 LBS | BODY MASS INDEX: 31.56 KG/M2 | SYSTOLIC BLOOD PRESSURE: 130 MMHG | HEIGHT: 65 IN | HEART RATE: 84 BPM

## 2018-04-03 DIAGNOSIS — Z98.891 HISTORY OF CESAREAN DELIVERY: ICD-10-CM

## 2018-04-03 DIAGNOSIS — Z36.86 ENCOUNTER FOR ANTENATAL SCREENING FOR CERVICAL LENGTH: ICD-10-CM

## 2018-04-03 DIAGNOSIS — Z3A.21 21 WEEKS GESTATION OF PREGNANCY: ICD-10-CM

## 2018-04-03 DIAGNOSIS — O99.212 OBESITY AFFECTING PREGNANCY IN SECOND TRIMESTER: Primary | ICD-10-CM

## 2018-04-03 DIAGNOSIS — Z34.82 PRENATAL CARE, SUBSEQUENT PREGNANCY, SECOND TRIMESTER: ICD-10-CM

## 2018-04-03 PROCEDURE — 76817 TRANSVAGINAL US OBSTETRIC: CPT | Performed by: OBSTETRICS & GYNECOLOGY

## 2018-04-03 PROCEDURE — 76811 OB US DETAILED SNGL FETUS: CPT | Performed by: OBSTETRICS & GYNECOLOGY

## 2018-04-20 ENCOUNTER — ROUTINE PRENATAL (OUTPATIENT)
Dept: OBGYN CLINIC | Facility: MEDICAL CENTER | Age: 28
End: 2018-04-20

## 2018-04-20 VITALS — SYSTOLIC BLOOD PRESSURE: 124 MMHG | WEIGHT: 193.2 LBS | BODY MASS INDEX: 32.15 KG/M2 | DIASTOLIC BLOOD PRESSURE: 74 MMHG

## 2018-04-20 DIAGNOSIS — Z98.891 HISTORY OF CESAREAN DELIVERY: ICD-10-CM

## 2018-04-20 DIAGNOSIS — Z3A.24 24 WEEKS GESTATION OF PREGNANCY: Primary | ICD-10-CM

## 2018-04-20 DIAGNOSIS — Z34.82 PRENATAL CARE, SUBSEQUENT PREGNANCY, SECOND TRIMESTER: ICD-10-CM

## 2018-04-20 PROCEDURE — PNV: Performed by: OBSTETRICS & GYNECOLOGY

## 2018-04-20 NOTE — ASSESSMENT & PLAN NOTE
Feels well, some upper respiratory congestion--recent eval negative for Strep or URI  Advised loratadine qD, neti pot PRN

## 2018-04-20 NOTE — PROGRESS NOTES
Problem List Items Addressed This Visit        Other    Prenatal care, subsequent pregnancy, second trimester     Feels well, some upper respiratory congestion--recent eval negative for Strep or URI  Advised loratadine qD, neti pot PRN  History of  delivery     Had planned primary C/S due to breech presentation and declined ECV  Pt leaning toward R C/S for this delivery  Discussed pros/cons of TOLAC vs R C/S relative to maternal and fetal risk             Other Visit Diagnoses     24 weeks gestation of pregnancy    -  Primary    Relevant Orders    CBC    Glucose, 1H PG    RPR

## 2018-04-20 NOTE — ASSESSMENT & PLAN NOTE
Had planned primary C/S due to breech presentation and declined ECV  Pt leaning toward R C/S for this delivery  Discussed pros/cons of TOLAC vs R C/S relative to maternal and fetal risk

## 2018-05-11 ENCOUNTER — APPOINTMENT (OUTPATIENT)
Dept: LAB | Facility: MEDICAL CENTER | Age: 28
End: 2018-05-11
Payer: COMMERCIAL

## 2018-05-11 LAB
ERYTHROCYTE [DISTWIDTH] IN BLOOD BY AUTOMATED COUNT: 13.4 % (ref 11.6–15.1)
GLUCOSE 1H P 50 G GLC PO SERPL-MCNC: 151 MG/DL
HCT VFR BLD AUTO: 38.2 % (ref 34.8–46.1)
HGB BLD-MCNC: 12.5 G/DL (ref 11.5–15.4)
MCH RBC QN AUTO: 30.5 PG (ref 26.8–34.3)
MCHC RBC AUTO-ENTMCNC: 32.7 G/DL (ref 31.4–37.4)
MCV RBC AUTO: 93 FL (ref 82–98)
PLATELET # BLD AUTO: 282 THOUSANDS/UL (ref 149–390)
PMV BLD AUTO: 9.3 FL (ref 8.9–12.7)
RBC # BLD AUTO: 4.1 MILLION/UL (ref 3.81–5.12)
WBC # BLD AUTO: 9.59 THOUSAND/UL (ref 4.31–10.16)

## 2018-05-11 PROCEDURE — 86592 SYPHILIS TEST NON-TREP QUAL: CPT | Performed by: OBSTETRICS & GYNECOLOGY

## 2018-05-11 PROCEDURE — 85027 COMPLETE CBC AUTOMATED: CPT | Performed by: OBSTETRICS & GYNECOLOGY

## 2018-05-11 PROCEDURE — 82950 GLUCOSE TEST: CPT | Performed by: OBSTETRICS & GYNECOLOGY

## 2018-05-11 PROCEDURE — 36415 COLL VENOUS BLD VENIPUNCTURE: CPT | Performed by: OBSTETRICS & GYNECOLOGY

## 2018-05-14 ENCOUNTER — TELEPHONE (OUTPATIENT)
Dept: OBGYN CLINIC | Facility: CLINIC | Age: 28
End: 2018-05-14

## 2018-05-14 DIAGNOSIS — O99.810 ABNORMAL GLUCOSE AFFECTING PREGNANCY: Primary | ICD-10-CM

## 2018-05-14 LAB — RPR SER QL: NORMAL

## 2018-05-14 NOTE — TELEPHONE ENCOUNTER
----- Message from Migel Velazquez DO sent at 5/14/2018  9:46 AM EDT -----  Inform pt one hour GTT abnormal   Please order 3 hour GTT

## 2018-05-15 ENCOUNTER — TELEPHONE (OUTPATIENT)
Dept: OBGYN CLINIC | Facility: CLINIC | Age: 28
End: 2018-05-15

## 2018-05-15 DIAGNOSIS — R73.09 ABNORMAL GLUCOSE: Primary | ICD-10-CM

## 2018-05-15 NOTE — TELEPHONE ENCOUNTER
----- Message from Lidya Sow DO sent at 5/14/2018  6:53 PM EDT -----  Inform pt one hour GTT abnormal   Please order 3 hour GTT

## 2018-05-15 NOTE — TELEPHONE ENCOUNTER
----- Message from Don Barron DO sent at 5/14/2018  6:53 PM EDT -----  Inform pt one hour GTT abnormal   Please order 3 hour GTT

## 2018-05-16 ENCOUNTER — APPOINTMENT (OUTPATIENT)
Dept: LAB | Facility: CLINIC | Age: 28
End: 2018-05-16
Payer: COMMERCIAL

## 2018-05-16 ENCOUNTER — TRANSCRIBE ORDERS (OUTPATIENT)
Dept: LAB | Facility: CLINIC | Age: 28
End: 2018-05-16

## 2018-05-16 DIAGNOSIS — R73.09 ABNORMAL GLUCOSE: ICD-10-CM

## 2018-05-16 LAB
GLUCOSE 1H P 100 G GLC PO SERPL-MCNC: 131 MG/DL (ref 65–179)
GLUCOSE 2H P 100 G GLC PO SERPL-MCNC: 122 MG/DL (ref 65–154)
GLUCOSE 3H P 100 G GLC PO SERPL-MCNC: 86 MG/DL (ref 40–500)
GLUCOSE P FAST SERPL-MCNC: 84 MG/DL (ref 65–99)

## 2018-05-16 PROCEDURE — 36415 COLL VENOUS BLD VENIPUNCTURE: CPT

## 2018-05-16 PROCEDURE — 82952 GTT-ADDED SAMPLES: CPT

## 2018-05-16 PROCEDURE — 82951 GLUCOSE TOLERANCE TEST (GTT): CPT

## 2018-05-18 ENCOUNTER — ROUTINE PRENATAL (OUTPATIENT)
Dept: OBGYN CLINIC | Age: 28
End: 2018-05-18

## 2018-05-18 ENCOUNTER — TELEPHONE (OUTPATIENT)
Dept: OBGYN CLINIC | Facility: CLINIC | Age: 28
End: 2018-05-18

## 2018-05-18 VITALS — WEIGHT: 198 LBS | DIASTOLIC BLOOD PRESSURE: 62 MMHG | BODY MASS INDEX: 32.95 KG/M2 | SYSTOLIC BLOOD PRESSURE: 118 MMHG

## 2018-05-18 DIAGNOSIS — Z34.83 ENCOUNTER FOR SUPERVISION OF OTHER NORMAL PREGNANCY, THIRD TRIMESTER: Primary | ICD-10-CM

## 2018-05-18 DIAGNOSIS — Z98.891 HISTORY OF CESAREAN DELIVERY: ICD-10-CM

## 2018-05-18 PROBLEM — Z34.90 SUPERVISION OF NORMAL PREGNANCY: Status: RESOLVED | Noted: 2018-02-23 | Resolved: 2018-05-18

## 2018-05-18 PROCEDURE — PNV: Performed by: NURSE PRACTITIONER

## 2018-05-18 NOTE — PROGRESS NOTES
Feels well  Denies LOF/CTX/VB  No concerns  Will return for  discussion with KTM  Has allergies and GERD currently but has not tried any meds so given the safe list again  All questions answered  TDAP on RV, none available at the office

## 2018-05-18 NOTE — TELEPHONE ENCOUNTER
----- Message from Ana Greenwood DO sent at 5/17/2018  8:05 PM EDT -----  Please call the patient regarding her result    3hr GTT WNL

## 2018-05-18 NOTE — PATIENT INSTRUCTIONS
Pregnancy at 32 to 30 Weeks   AMBULATORY CARE:   What changes are happening to your body: You may notice new symptoms such as shortness of breath, heartburn, or swelling of your ankles and feet  You may also have trouble sleeping or contractions  Seek care immediately if:   · You develop a severe headache that does not go away  · You have new or increased vision changes, such as blurred or spotted vision  · You have new or increased swelling in your face or hands  · You have vaginal spotting or bleeding  · Your water broke or you feel warm water gushing or trickling from your vagina  Contact your healthcare provider if:   · You have more than 5 contractions in 1 hour  · You notice any changes in your baby's movements  · You have abdominal cramps, pressure, or tightening  · You have a change in vaginal discharge  · You have chills or a fever  · You have vaginal itching, burning, or pain  · You have yellow, green, white, or foul-smelling vaginal discharge  · You have pain or burning when you urinate, less urine than usual, or pink or bloody urine  · You have questions or concerns about your condition or care  How to care for yourself at this stage of your pregnancy:   · Eat a variety of healthy foods  Healthy foods include fruits, vegetables, whole-grain breads, low-fat dairy foods, beans, lean meats, and fish  Drink liquids as directed  Ask how much liquid to drink each day and which liquids are best for you  Limit caffeine to less than 200 milligrams each day  Limit your intake of fish to 2 servings each week  Choose fish low in mercury such as canned light tuna, shrimp, salmon, cod, or tilapia  Do not  eat fish high in mercury such as swordfish, tilefish, rashaad mackerel, and shark  · Manage heartburn  by eating 4 or 5 small meals each day instead of large meals  Avoid spicy food  · Manage swelling  by lying down and putting your feet up       · Take prenatal vitamins as directed  Your need for certain vitamins and minerals, such as folic acid, increases during pregnancy  Prenatal vitamins provide some of the extra vitamins and minerals you need  Prenatal vitamins may also help to decrease the risk of certain birth defects  · Talk to your healthcare provider about exercise  Moderate exercise can help you stay fit  Your healthcare provider will help you plan an exercise program that is safe for you during pregnancy  · Do not smoke  If you smoke, it is never too late to quit  Smoking increases your risk of a miscarriage and other health problems during your pregnancy  Smoking can cause your baby to be born too early or weigh less at birth  Ask your healthcare provider for information if you need help quitting  · Do not drink alcohol  Alcohol passes from your body to your baby through the placenta  It can affect your baby's brain development and cause fetal alcohol syndrome (FAS)  FAS is a group of conditions that causes mental, behavior, and growth problems  · Talk to your healthcare provider before you take any medicines  Many medicines may harm your baby if you take them when you are pregnant  Do not take any medicines, vitamins, herbs, or supplements without first talking to your healthcare provider  Never use illegal or street drugs (such as marijuana or cocaine) while you are pregnant  Safety tips during pregnancy:   · Avoid hot tubs and saunas  Do not use a hot tub or sauna while you are pregnant, especially during your first trimester  Hot tubs and saunas may raise your baby's temperature and increase the risk of birth defects  · Avoid toxoplasmosis  This is an infection caused by eating raw meat or being around infected cat feces  It can cause birth defects, miscarriages, and other problems  Wash your hands after you touch raw meat  Make sure any meat is well-cooked before you eat it  Avoid raw eggs and unpasteurized milk   Use gloves or ask someone else to clean your cat's litter box while you are pregnant  Changes that are happening with your baby:  By 30 weeks, your baby may weigh more than 3 pounds  Your baby may be about 11 inches long from the top of the head to the rump (baby's bottom)  Your baby's eyes open and close now  Your baby's kicks and movements are more forceful at this time  What you need to know about prenatal care: Your healthcare provider will check your blood pressure and weight  You may also need the following:  · Blood tests  may be done to check for anemia or blood type  · A urine test  may also be done to check for sugar and protein  These can be signs of gestational diabetes or infection  Protein in your urine may also be a sign of preeclampsia  Preeclampsia is a condition that can develop during week 20 or later of your pregnancy  It causes high blood pressure, and it can cause problems with your kidneys and other organs  · A Tdap vaccine and flu vaccine  may be recommended by your healthcare provider  · A gestational diabetes screen  will be done using an oral glucose tolerance test (OGTT)  An OGTT starts with a blood sugar level check after you have not eaten for 8 hours  You are then given a glucose drink  Your blood sugar level is checked after 1 hour, 2 hours, and sometimes 3 hours  Healthcare providers look at how much your blood sugar level increases from the first check  · Fundal height  is a measurement of your uterus to check your baby's growth  This number is usually the same as the number of weeks that you have been pregnant  Your healthcare provider may also check your baby's position  · Your baby's heart rate  will be checked  © 2017 [facility] Information is for End User's use only and may not be sold, redistributed or otherwise used for commercial purposes   All illustrations and images included in CareNotes® are the copyrighted property of A D A M , Inc  or [employer]  The above information is an  only  It is not intended as medical advice for individual conditions or treatments  Talk to your doctor, nurse or pharmacist before following any medical regimen to see if it is safe and effective for you

## 2018-05-30 ENCOUNTER — ROUTINE PRENATAL (OUTPATIENT)
Dept: OBGYN CLINIC | Age: 28
End: 2018-05-30
Payer: COMMERCIAL

## 2018-05-30 VITALS — BODY MASS INDEX: 33.78 KG/M2 | WEIGHT: 203 LBS | SYSTOLIC BLOOD PRESSURE: 130 MMHG | DIASTOLIC BLOOD PRESSURE: 82 MMHG

## 2018-05-30 DIAGNOSIS — Z98.891 HISTORY OF CESAREAN DELIVERY: ICD-10-CM

## 2018-05-30 DIAGNOSIS — Z34.83 ENCOUNTER FOR SUPERVISION OF OTHER NORMAL PREGNANCY, THIRD TRIMESTER: Primary | ICD-10-CM

## 2018-05-30 DIAGNOSIS — Z23 NEED FOR TDAP VACCINATION: ICD-10-CM

## 2018-05-30 DIAGNOSIS — K21.9 GASTROESOPHAGEAL REFLUX DISEASE WITHOUT ESOPHAGITIS: ICD-10-CM

## 2018-05-30 PROCEDURE — PNV: Performed by: NURSE PRACTITIONER

## 2018-05-30 PROCEDURE — 90715 TDAP VACCINE 7 YRS/> IM: CPT

## 2018-05-30 PROCEDURE — 90471 IMMUNIZATION ADMIN: CPT | Performed by: NURSE PRACTITIONER

## 2018-05-30 NOTE — PROGRESS NOTES
Problem List Items Addressed This Visit     Encounter for supervision of other normal pregnancy, third trimester - Primary    History of  delivery      Other Visit Diagnoses     Need for Tdap vaccination        Relevant Orders    TDAP Vaccine greater than or equal to 6yo (Completed)    Gastroesophageal reflux disease without esophagitis          Feels well  Here with daughter  Denies LOF/CTX/VB  Having Reflux, did not try otcs other than TUMS  Will do so and call if no response  Tdap today  No other concerns  Would like a Csection with MICHELET

## 2018-05-30 NOTE — PATIENT INSTRUCTIONS
Gastroesophageal Reflux Disease   WHAT YOU NEED TO KNOW:   What is gastroesophageal reflux disease? Gastroesophageal reflux occurs when acid and food in the stomach back up into the esophagus  Gastroesophageal reflux disease (GERD) is reflux that occurs more than twice a week for a few weeks  It usually causes heartburn and other symptoms  GERD can cause other health problems over time if it is not treated  What causes GERD? GERD often occurs when the lower muscle (sphincter) of the esophagus does not close properly  The sphincter normally opens to let food into the stomach  It then closes to keep food and stomach acid in the stomach  If the sphincter does not close properly, stomach acid and food back up (reflux) into the esophagus  The following may increase your risk for GERD:  · Certain foods such as spicy foods, chocolate, foods that contain caffeine, peppermint, and fried foods    · Hiatal hernia    · Certain medicines such as calcium channel blockers (used to treat high blood pressure), allergy medicines, sedatives, or antidepressants    · Pregnancy or obesity    · Lying down after a meal    · Drinking alcohol or smoking  What are the signs and symptoms of GERD? Heartburn is the most common symptom of GERD  You may feel burning pain in your chest or below the breast bone  This usually occurs after meals and spreads to your neck, jaw, or shoulder  The pain gets better when you change positions  You may also have any of the following:  · Bitter or acid taste in your mouth    · Dry cough    · Trouble swallowing or pain with swallowing    · Hoarseness or sore throat    · Frequent burping or hiccups    · Feeling of fullness soon after you start eating  How is GERD diagnosed? Your healthcare provider will ask about your symptoms and when they started  Tell him or her about other medical conditions you have, your eating habits, and your activities   You may also need any of the following:  · Esophageal pH monitoring  is used to place a small probe inside your esophagus and stomach to check the amount of acid  · An endoscopy  is a procedure used to look at the inside of your esophagus and stomach  An endoscope is a bendable tube with a light and camera on the end  Your healthcare provider may remove a small sample of tissue and send it to a lab for tests  · Upper GI x-rays  are done to take pictures of your stomach and intestines (bowel)  You may be given a chalky liquid to drink before the pictures are taken  This liquid helps your stomach and intestines show up better on the x-rays  · Esophageal manometry  is a test that shows how your esophagus pushes food and fluid to your stomach  It also shows the pressures in your esophagus and stomach  It may show a hiatal hernia  How is GERD treated? · Medicines  are used to decrease stomach acid  Medicine may also be used to help your lower esophageal sphincter and stomach contract (tighten) more  · Surgery  is done to wrap the upper part of the stomach around the esophageal sphincter  This will strengthen the sphincter and prevent reflux  How can I help manage GERD? · Do not have foods or drinks that may increase heartburn  These include chocolate, peppermint, fried or fatty foods, drinks that contain caffeine, or carbonated drinks (soda)  Other foods include spicy foods, onions, tomatoes, and tomato-based foods  Do not have foods or drinks that can irritate your esophagus, such as citrus fruits, juices, and alcohol  · Do not eat large meals  When you eat a lot of food at one time, your stomach needs more acid to digest it  Eat 6 small meals each day instead of 3 large ones, and eat slowly  Do not eat meals 2 to 3 hours before bedtime  · Elevate the head of your bed  Place 6-inch blocks under the head of your bed frame  You may also use more than one pillow under your head and shoulders while you sleep  · Maintain a healthy weight    If you are overweight, weight loss may help relieve symptoms of GERD  · Do not smoke  Smoking weakens the lower esophageal sphincter and increases the risk of GERD  Ask your healthcare provider for information if you currently smoke and need help to quit  E-cigarettes or smokeless tobacco still contain nicotine  Talk to your healthcare provider before you use these products  · Do not wear clothing that is tight around your waist   Tight clothing can put pressure on your stomach and cause or worsen GERD symptoms  When should I seek immediate care? · You feel full and cannot burp or vomit  · You have severe chest pain and sudden trouble breathing  · Your bowel movements are black, bloody, or tarry-looking  · Your vomit looks like coffee grounds or has blood in it  When should I contact my healthcare provider? · You vomit large amounts, or you vomit often  · You have trouble breathing after you vomit  · You have trouble swallowing, or pain with swallowing  · You are losing weight without trying  · Your symptoms get worse or do not improve with treatment  · You have questions or concerns about your condition or care  CARE AGREEMENT:   You have the right to help plan your care  Learn about your health condition and how it may be treated  Discuss treatment options with your caregivers to decide what care you want to receive  You always have the right to refuse treatment  The above information is an  only  It is not intended as medical advice for individual conditions or treatments  Talk to your doctor, nurse or pharmacist before following any medical regimen to see if it is safe and effective for you  © 2017 [employer] Information is for End User's use only and may not be sold, redistributed or otherwise used for commercial purposes   All illustrations and images included in CareNotes® are the copyrighted property of A D A M , Inc  or [facility] [employer]

## 2018-06-15 ENCOUNTER — ROUTINE PRENATAL (OUTPATIENT)
Dept: OBGYN CLINIC | Facility: CLINIC | Age: 28
End: 2018-06-15

## 2018-06-15 VITALS — DIASTOLIC BLOOD PRESSURE: 78 MMHG | BODY MASS INDEX: 34.21 KG/M2 | SYSTOLIC BLOOD PRESSURE: 128 MMHG | WEIGHT: 205.6 LBS

## 2018-06-15 DIAGNOSIS — Z98.891 HISTORY OF CESAREAN DELIVERY: ICD-10-CM

## 2018-06-15 DIAGNOSIS — Z34.83 ENCOUNTER FOR SUPERVISION OF OTHER NORMAL PREGNANCY, THIRD TRIMESTER: ICD-10-CM

## 2018-06-15 PROCEDURE — PNV: Performed by: OBSTETRICS & GYNECOLOGY

## 2018-06-15 NOTE — PROGRESS NOTES
Problem List Items Addressed This Visit        Other    Encounter for supervision of other normal pregnancy, third trimester     Feels well  No complaints  Desires R C/S  Scheduled with me on 18 at 0800  Pt aware           History of  delivery

## 2018-06-28 ENCOUNTER — TELEPHONE (OUTPATIENT)
Dept: OBGYN CLINIC | Facility: CLINIC | Age: 28
End: 2018-06-28

## 2018-06-28 NOTE — TELEPHONE ENCOUNTER
Spoke with patient  Works full time as dental assistant  Starting to have bilateral edema in feet and tingling in ars  Denies,headache and visual disturbances  No epigastric pain  B/P have been WNL  Advised decrease in sodium intake and increase fluids  Can get support stockings and arm support braces for carpal tunnel syndrome  Advised Tylenol and to elevate feet as much as possible  Patient verbalizes understanding  Will call back Monday if measures have not helped  Next routine prenatal visit is 7/6/18

## 2018-06-28 NOTE — TELEPHONE ENCOUNTER
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

## 2018-07-06 ENCOUNTER — ROUTINE PRENATAL (OUTPATIENT)
Dept: OBGYN CLINIC | Facility: MEDICAL CENTER | Age: 28
End: 2018-07-06

## 2018-07-06 VITALS — SYSTOLIC BLOOD PRESSURE: 102 MMHG | WEIGHT: 210 LBS | BODY MASS INDEX: 34.95 KG/M2 | DIASTOLIC BLOOD PRESSURE: 60 MMHG

## 2018-07-06 DIAGNOSIS — Z98.891 HISTORY OF CESAREAN DELIVERY: Primary | ICD-10-CM

## 2018-07-06 DIAGNOSIS — Z3A.35 35 WEEKS GESTATION OF PREGNANCY: ICD-10-CM

## 2018-07-06 DIAGNOSIS — Z34.83 ENCOUNTER FOR SUPERVISION OF OTHER NORMAL PREGNANCY, THIRD TRIMESTER: ICD-10-CM

## 2018-07-06 PROCEDURE — 87653 STREP B DNA AMP PROBE: CPT | Performed by: OBSTETRICS & GYNECOLOGY

## 2018-07-06 PROCEDURE — PNV: Performed by: OBSTETRICS & GYNECOLOGY

## 2018-07-06 RX ORDER — OXYCODONE HYDROCHLORIDE AND ACETAMINOPHEN 5; 325 MG/1; MG/1
1 TABLET ORAL EVERY 4 HOURS PRN
Qty: 15 TABLET | Refills: 0 | Status: SHIPPED | OUTPATIENT
Start: 2018-07-06 | End: 2018-08-27

## 2018-07-06 NOTE — PROGRESS NOTES
scheduled for 2018  GBS today  Wash instructions and Hibiclens given  Some decreased movement today

## 2018-07-06 NOTE — ASSESSMENT & PLAN NOTE
Feels well  Increased swelling while at work  R C/S scheduled 8/6/18 0800/  Pre-op reviewed  Hibiclens provided   Percocet ERX sent to pharmacy  GBS obtained  Will consider stopping work at 39-44 weeks

## 2018-07-06 NOTE — PROGRESS NOTES
Problem List Items Addressed This Visit        Other    Encounter for supervision of other normal pregnancy, third trimester     Feels well  Increased swelling while at work  R C/S scheduled 18 0800/  Pre-op reviewed  Hibiclens provided   Percocet ERX sent to pharmacy  GBS obtained  Will consider stopping work at 39-44 weeks           History of  delivery - Primary    Relevant Medications    oxyCODONE-acetaminophen (PERCOCET) 5-325 mg per tablet      Other Visit Diagnoses     35 weeks gestation of pregnancy        Relevant Orders    Strep B DNA probe, amplification

## 2018-07-09 LAB — GP B STREP DNA SPEC QL NAA+PROBE: NORMAL

## 2018-07-18 ENCOUNTER — ROUTINE PRENATAL (OUTPATIENT)
Dept: OBGYN CLINIC | Age: 28
End: 2018-07-18

## 2018-07-18 VITALS — DIASTOLIC BLOOD PRESSURE: 64 MMHG | SYSTOLIC BLOOD PRESSURE: 122 MMHG | BODY MASS INDEX: 37.11 KG/M2 | WEIGHT: 223 LBS

## 2018-07-18 DIAGNOSIS — Z98.891 HISTORY OF CESAREAN DELIVERY: ICD-10-CM

## 2018-07-18 DIAGNOSIS — Z34.83 ENCOUNTER FOR SUPERVISION OF OTHER NORMAL PREGNANCY, THIRD TRIMESTER: Primary | ICD-10-CM

## 2018-07-18 DIAGNOSIS — Z3A.37 37 WEEKS GESTATION OF PREGNANCY: ICD-10-CM

## 2018-07-18 DIAGNOSIS — E66.3 OVERWEIGHT: ICD-10-CM

## 2018-07-18 PROCEDURE — PNV: Performed by: NURSE PRACTITIONER

## 2018-07-18 NOTE — PATIENT INSTRUCTIONS
Pregnancy at 28 to 38 Weeks   AMBULATORY CARE:   What changes are happening to your body: You are considered full term at the beginning of 37 weeks  Your breathing may be easier if your baby has moved down into a head-down position  You may need to urinate more often because the baby may be pressing on your bladder  You may also feel more discomfort and get tired easily  Seek care immediately if:   · You develop a severe headache that does not go away  · You have new or increased vision changes, such as blurred or spotted vision  · You have new or increased swelling in your face or hands  · You have vaginal spotting or bleeding  · Your water broke or you feel warm water gushing or trickling from your vagina  Contact your healthcare provider if:   · You have more than 5 contractions in 1 hour  · You notice any changes in your baby's movements  · You have abdominal cramps, pressure, or tightening  · You have a change in vaginal discharge  · You have chills or a fever  · You have vaginal itching, burning, or pain  · You have yellow, green, white, or foul-smelling vaginal discharge  · You have pain or burning when you urinate, less urine than usual, or pink or bloody urine  · You have questions or concerns about your condition or care  How to care for yourself at this stage of your pregnancy:   · Eat a variety of healthy foods  Healthy foods include fruits, vegetables, whole-grain breads, low-fat dairy foods, beans, lean meats, and fish  Drink liquids as directed  Ask how much liquid to drink each day and which liquids are best for you  Limit caffeine to less than 200 milligrams each day  Limit your intake of fish to 2 servings each week  Choose fish low in mercury such as canned light tuna, shrimp, crab, salmon, cod, or tilapia  Do not  eat fish high in mercury such as swordfish, tilefish, rashaad mackerel, and shark  · Take prenatal vitamins as directed    Your need for certain vitamins and minerals, such as folic acid, increases during pregnancy  Prenatal vitamins provide some of the extra vitamins and minerals you need  Prenatal vitamins may also help to decrease the risk of certain birth defects  · Rest as needed  Put your feet up if you have swelling in your ankles and feet  · Do not smoke  If you smoke, it is never too late to quit  Smoking increases your risk of a miscarriage and other health problems during your pregnancy  Smoking can cause your baby to be born too early or weigh less at birth  Ask your healthcare provider for information if you need help quitting  · Do not drink alcohol  Alcohol passes from your body to your baby through the placenta  It can affect your baby's brain development and cause fetal alcohol syndrome (FAS)  FAS is a group of conditions that causes mental, behavior, and growth problems  · Talk to your healthcare provider before you take any medicines  Many medicines may harm your baby if you take them when you are pregnant  Do not take any medicines, vitamins, herbs, or supplements without first talking to your healthcare provider  Never use illegal or street drugs (such as marijuana or cocaine) while you are pregnant  · Talk to your healthcare provider before you travel  You may not be able to travel in an airplane after 36 weeks  He may also recommend that you avoid long road trips  Safety tips during pregnancy:   · Avoid hot tubs and saunas  Do not use a hot tub or sauna while you are pregnant, especially during your first trimester  Hot tubs and saunas may raise your baby's temperature and increase the risk of birth defects  · Avoid toxoplasmosis  This is an infection caused by eating raw meat or being around infected cat feces  It can cause birth defects, miscarriages, and other problems  Wash your hands after you touch raw meat  Make sure any meat is well-cooked before you eat it   Avoid raw eggs and unpasteurized milk  Use gloves or ask someone else to clean your cat's litter box while you are pregnant  · Ask your healthcare provider about travel  The most comfortable time to travel is during the second trimester  Ask your healthcare provider if you can travel after 36 weeks  You may not be able to travel in an airplane after 36 weeks  He may also recommend that you avoid long road trips  Changes that are happening with your baby:  By 38 weeks, your baby may weigh between 6 and 9 pounds  Your baby may be about 14 inches long from the top of the head to the rump (baby's bottom)  Your baby hears well enough to know your voice  As your baby gets larger, you may feel fewer kicks and more stretching and rolling  Your baby may move into a head-down position  Your baby will also rest lower in your abdomen  What you need to know about prenatal care: Your healthcare provider will check your blood pressure and weight  You may also need the following:  · A urine test  may also be done to check for sugar and protein  These can be signs of gestational diabetes or infection  Protein in your urine may also be a sign of preeclampsia  Preeclampsia is a condition that can develop during week 20 or later of your pregnancy  It causes high blood pressure, and it can cause problems with your kidneys and other organs  · A blood test  may be done to check for anemia (low iron level)  · A Tdap vaccine  may be recommended by your healthcare provider  · A group B strep test  is a test that is done to check for group B strep infection  Group B strep is a type of bacteria that may be found in the vagina or rectum  It can be passed to your baby during delivery if you have it  Your healthcare provider will take swab your vagina or rectum and send the sample to the lab for tests  · Fundal height  is a measurement of your uterus to check your baby's growth   This number is usually the same as the number of weeks that you have been pregnant  Your healthcare provider may also check your baby's position  · Your baby's heart rate  will be checked  © 2017 [employer] Information is for End User's use only and may not be sold, redistributed or otherwise used for commercial purposes  All illustrations and images included in CareNotes® are the copyrighted property of [employer]  or Shilo Rodriguez  The above information is an  only  It is not intended as medical advice for individual conditions or treatments  Talk to your doctor, nurse or pharmacist before following any medical regimen to see if it is safe and effective for you

## 2018-07-18 NOTE — PROGRESS NOTES
Problem List Items Addressed This Visit     Overweight    Encounter for supervision of other normal pregnancy, third trimester - Primary    History of  delivery    37 weeks gestation of pregnancy        Feels well  Denies LOF/CTX/VB  Occasional BH and pressure, nothing consistent  No concerns  Discussed fetal kick counting  Repeat csection with Dr Rena Sena on   GBS neg

## 2018-07-24 ENCOUNTER — TELEPHONE (OUTPATIENT)
Dept: OBGYN CLINIC | Facility: MEDICAL CENTER | Age: 28
End: 2018-07-24

## 2018-07-24 NOTE — TELEPHONE ENCOUNTER
Pt called and is 38wks pregnant and she's been having some tingling and numbness in her right leg and foot  She been having some swelling and states it does increase throughout the day and when she wakes up  She wanted to know if she should wait for her appt with Michelle on Friday to address it or speak with someone before than

## 2018-07-24 NOTE — TELEPHONE ENCOUNTER
Returned pts' p c - advised to elevate legs whenever possible, wear good supportive shoes if able, - no flip-flops, increase water intake, decrease salt intake, awareness of foods containing increased sodium  Pt denies vag blding, LOF, denies pain, neg homans,  states has +FM  Has f/u appt scheduled  Advised to call if change in symptoms, or any further questions/concerns

## 2018-07-27 ENCOUNTER — ROUTINE PRENATAL (OUTPATIENT)
Dept: OBGYN CLINIC | Facility: MEDICAL CENTER | Age: 28
End: 2018-07-27

## 2018-07-27 VITALS — BODY MASS INDEX: 36.78 KG/M2 | WEIGHT: 221 LBS | DIASTOLIC BLOOD PRESSURE: 80 MMHG | SYSTOLIC BLOOD PRESSURE: 140 MMHG

## 2018-07-27 DIAGNOSIS — Z34.83 ENCOUNTER FOR SUPERVISION OF OTHER NORMAL PREGNANCY, THIRD TRIMESTER: Primary | ICD-10-CM

## 2018-07-27 DIAGNOSIS — Z98.891 HISTORY OF CESAREAN DELIVERY: ICD-10-CM

## 2018-07-27 PROCEDURE — PNV: Performed by: OBSTETRICS & GYNECOLOGY

## 2018-07-27 RX ORDER — FAMOTIDINE 10 MG
10 TABLET ORAL 2 TIMES DAILY
COMMUNITY
End: 2019-03-16

## 2018-07-27 NOTE — PROGRESS NOTES
[place] is doing well  Some general aches/pains  Some carpal tunnel symptoms  Is ready for her  next week  Blood pressure re-check was 130/70

## 2018-07-27 NOTE — PROGRESS NOTES
Patient has numbness of her hands, ankles, and tailbone  Check-in done this morning  C/S scheduled with Dr Barakat Peers- she has wash instructions and Hibiclens

## 2018-08-01 ENCOUNTER — ROUTINE PRENATAL (OUTPATIENT)
Dept: OBGYN CLINIC | Age: 28
End: 2018-08-01

## 2018-08-01 VITALS — SYSTOLIC BLOOD PRESSURE: 122 MMHG | BODY MASS INDEX: 37.86 KG/M2 | DIASTOLIC BLOOD PRESSURE: 88 MMHG | WEIGHT: 227.5 LBS

## 2018-08-01 DIAGNOSIS — Z98.891 HISTORY OF CESAREAN DELIVERY: ICD-10-CM

## 2018-08-01 DIAGNOSIS — Z34.83 ENCOUNTER FOR SUPERVISION OF OTHER NORMAL PREGNANCY, THIRD TRIMESTER: Primary | ICD-10-CM

## 2018-08-01 PROCEDURE — PNV: Performed by: OBSTETRICS & GYNECOLOGY

## 2018-08-02 ENCOUNTER — HOSPITAL ENCOUNTER (INPATIENT)
Facility: HOSPITAL | Age: 28
LOS: 3 days | Discharge: HOME | End: 2018-08-05
Attending: OBSTETRICS & GYNECOLOGY | Admitting: OBSTETRICS & GYNECOLOGY
Payer: COMMERCIAL

## 2018-08-02 ENCOUNTER — ANESTHESIA (INPATIENT)
Dept: LABOR AND DELIVERY | Facility: HOSPITAL | Age: 28
End: 2018-08-02
Payer: COMMERCIAL

## 2018-08-02 ENCOUNTER — ANESTHESIA EVENT (INPATIENT)
Dept: LABOR AND DELIVERY | Facility: HOSPITAL | Age: 28
End: 2018-08-02
Payer: COMMERCIAL

## 2018-08-02 DIAGNOSIS — Z98.891 HISTORY OF CESAREAN DELIVERY: ICD-10-CM

## 2018-08-02 DIAGNOSIS — Z98.891 S/P CESAREAN SECTION: Primary | ICD-10-CM

## 2018-08-02 DIAGNOSIS — O34.219 PREVIOUS CESAREAN SECTION COMPLICATING PREGNANCY: ICD-10-CM

## 2018-08-02 LAB
ABO GROUP BLD: NORMAL
BASE EXCESS BLDCOA CALC-SCNC: -2.3 MMOL/L (ref 3–11)
BASE EXCESS BLDCOV CALC-SCNC: -1.8 MMOL/L (ref 1–9)
BASOPHILS # BLD AUTO: 0.03 THOUSANDS/ΜL (ref 0–0.1)
BASOPHILS NFR BLD AUTO: 0 % (ref 0–1)
BLD GP AB SCN SERPL QL: NEGATIVE
EOSINOPHIL # BLD AUTO: 0.11 THOUSAND/ΜL (ref 0–0.61)
EOSINOPHIL NFR BLD AUTO: 1 % (ref 0–6)
ERYTHROCYTE [DISTWIDTH] IN BLOOD BY AUTOMATED COUNT: 13.9 % (ref 11.6–15.1)
HCO3 BLDCOA-SCNC: 24.6 MMOL/L (ref 17.3–27.3)
HCO3 BLDCOV-SCNC: 23.3 MMOL/L (ref 12.2–28.6)
HCT VFR BLD AUTO: 40.6 % (ref 34.8–46.1)
HGB BLD-MCNC: 12.7 G/DL (ref 11.5–15.4)
IMM GRANULOCYTES # BLD AUTO: 0.12 THOUSAND/UL (ref 0–0.2)
IMM GRANULOCYTES NFR BLD AUTO: 1 % (ref 0–2)
LYMPHOCYTES # BLD AUTO: 1.19 THOUSANDS/ΜL (ref 0.6–4.47)
LYMPHOCYTES NFR BLD AUTO: 13 % (ref 14–44)
MCH RBC QN AUTO: 29.5 PG (ref 26.8–34.3)
MCHC RBC AUTO-ENTMCNC: 31.3 G/DL (ref 31.4–37.4)
MCV RBC AUTO: 94 FL (ref 82–98)
MONOCYTES # BLD AUTO: 0.67 THOUSAND/ΜL (ref 0.17–1.22)
MONOCYTES NFR BLD AUTO: 7 % (ref 4–12)
NEUTROPHILS # BLD AUTO: 7.4 THOUSANDS/ΜL (ref 1.85–7.62)
NEUTS SEG NFR BLD AUTO: 78 % (ref 43–75)
NRBC BLD AUTO-RTO: 0 /100 WBCS
O2 CT VFR BLDCOA CALC: 7.3 ML/DL
OXYHGB MFR BLDCOA: 33.1 %
OXYHGB MFR BLDCOV: 57.1 %
PCO2 BLDCOA: 49.9 MM[HG] (ref 30–60)
PCO2 BLDCOV: 41.3 MM HG (ref 27–43)
PH BLDCOA: 7.31 [PH] (ref 7.23–7.43)
PH BLDCOV: 7.37 [PH] (ref 7.19–7.49)
PLATELET # BLD AUTO: 270 THOUSANDS/UL (ref 149–390)
PMV BLD AUTO: 9.7 FL (ref 8.9–12.7)
PO2 BLDCOA: 16.3 MM HG (ref 5–25)
PO2 BLDCOV: 22.9 MM HG (ref 15–45)
RBC # BLD AUTO: 4.3 MILLION/UL (ref 3.81–5.12)
RH BLD: POSITIVE
RPR SER QL: NORMAL
SAO2 % BLDCOV: 13.2 ML/DL
SPECIMEN EXPIRATION DATE: NORMAL
WBC # BLD AUTO: 9.52 THOUSAND/UL (ref 4.31–10.16)

## 2018-08-02 PROCEDURE — 4A1HXCZ MONITORING OF PRODUCTS OF CONCEPTION, CARDIAC RATE, EXTERNAL APPROACH: ICD-10-PCS | Performed by: OBSTETRICS & GYNECOLOGY

## 2018-08-02 PROCEDURE — 86901 BLOOD TYPING SEROLOGIC RH(D): CPT | Performed by: OBSTETRICS & GYNECOLOGY

## 2018-08-02 PROCEDURE — 86850 RBC ANTIBODY SCREEN: CPT | Performed by: OBSTETRICS & GYNECOLOGY

## 2018-08-02 PROCEDURE — 82805 BLOOD GASES W/O2 SATURATION: CPT | Performed by: OBSTETRICS & GYNECOLOGY

## 2018-08-02 PROCEDURE — 85025 COMPLETE CBC W/AUTO DIFF WBC: CPT | Performed by: OBSTETRICS & GYNECOLOGY

## 2018-08-02 PROCEDURE — 86787 VARICELLA-ZOSTER ANTIBODY: CPT | Performed by: OBSTETRICS & GYNECOLOGY

## 2018-08-02 PROCEDURE — 86592 SYPHILIS TEST NON-TREP QUAL: CPT | Performed by: OBSTETRICS & GYNECOLOGY

## 2018-08-02 PROCEDURE — 86900 BLOOD TYPING SEROLOGIC ABO: CPT | Performed by: OBSTETRICS & GYNECOLOGY

## 2018-08-02 RX ORDER — OXYCODONE HYDROCHLORIDE AND ACETAMINOPHEN 5; 325 MG/1; MG/1
1 TABLET ORAL EVERY 4 HOURS PRN
Status: DISCONTINUED | OUTPATIENT
Start: 2018-08-02 | End: 2018-08-05 | Stop reason: HOSPADM

## 2018-08-02 RX ORDER — FAMOTIDINE 20 MG/1
10 TABLET, FILM COATED ORAL EVERY 12 HOURS SCHEDULED
Status: DISCONTINUED | OUTPATIENT
Start: 2018-08-02 | End: 2018-08-05 | Stop reason: HOSPADM

## 2018-08-02 RX ORDER — FENTANYL CITRATE/PF 50 MCG/ML
50 SYRINGE (ML) INJECTION
Status: DISCONTINUED | OUTPATIENT
Start: 2018-08-02 | End: 2018-08-05 | Stop reason: HOSPADM

## 2018-08-02 RX ORDER — ONDANSETRON 2 MG/ML
INJECTION INTRAMUSCULAR; INTRAVENOUS AS NEEDED
Status: DISCONTINUED | OUTPATIENT
Start: 2018-08-02 | End: 2018-08-02 | Stop reason: SURG

## 2018-08-02 RX ORDER — METOCLOPRAMIDE HYDROCHLORIDE 5 MG/ML
5 INJECTION INTRAMUSCULAR; INTRAVENOUS EVERY 6 HOURS PRN
Status: DISPENSED | OUTPATIENT
Start: 2018-08-02 | End: 2018-08-03

## 2018-08-02 RX ORDER — OXYCODONE HYDROCHLORIDE AND ACETAMINOPHEN 5; 325 MG/1; MG/1
2 TABLET ORAL EVERY 4 HOURS PRN
Status: ACTIVE | OUTPATIENT
Start: 2018-08-02 | End: 2018-08-03

## 2018-08-02 RX ORDER — NALBUPHINE HCL 10 MG/ML
5 AMPUL (ML) INJECTION
Status: DISPENSED | OUTPATIENT
Start: 2018-08-02 | End: 2018-08-03

## 2018-08-02 RX ORDER — KETOROLAC TROMETHAMINE 30 MG/ML
INJECTION, SOLUTION INTRAMUSCULAR; INTRAVENOUS AS NEEDED
Status: DISCONTINUED | OUTPATIENT
Start: 2018-08-02 | End: 2018-08-02 | Stop reason: SURG

## 2018-08-02 RX ORDER — CALCIUM CARBONATE 200(500)MG
1000 TABLET,CHEWABLE ORAL DAILY PRN
Status: DISCONTINUED | OUTPATIENT
Start: 2018-08-02 | End: 2018-08-05 | Stop reason: HOSPADM

## 2018-08-02 RX ORDER — IBUPROFEN 600 MG/1
600 TABLET ORAL EVERY 6 HOURS PRN
Status: DISCONTINUED | OUTPATIENT
Start: 2018-08-03 | End: 2018-08-05 | Stop reason: HOSPADM

## 2018-08-02 RX ORDER — ALBUMIN, HUMAN INJ 5% 5 %
SOLUTION INTRAVENOUS CONTINUOUS PRN
Status: DISCONTINUED | OUTPATIENT
Start: 2018-08-02 | End: 2018-08-02 | Stop reason: SURG

## 2018-08-02 RX ORDER — OXYCODONE HYDROCHLORIDE AND ACETAMINOPHEN 5; 325 MG/1; MG/1
1 TABLET ORAL EVERY 4 HOURS PRN
Status: DISCONTINUED | OUTPATIENT
Start: 2018-08-03 | End: 2018-08-05 | Stop reason: HOSPADM

## 2018-08-02 RX ORDER — ONDANSETRON 2 MG/ML
4 INJECTION INTRAMUSCULAR; INTRAVENOUS EVERY 4 HOURS PRN
Status: DISPENSED | OUTPATIENT
Start: 2018-08-02 | End: 2018-08-03

## 2018-08-02 RX ORDER — DOCUSATE SODIUM 100 MG/1
100 CAPSULE, LIQUID FILLED ORAL 2 TIMES DAILY
Status: DISCONTINUED | OUTPATIENT
Start: 2018-08-02 | End: 2018-08-05 | Stop reason: HOSPADM

## 2018-08-02 RX ORDER — BUPIVACAINE HYDROCHLORIDE 7.5 MG/ML
INJECTION, SOLUTION INTRASPINAL AS NEEDED
Status: DISCONTINUED | OUTPATIENT
Start: 2018-08-02 | End: 2018-08-02 | Stop reason: SURG

## 2018-08-02 RX ORDER — OXYTOCIN/RINGER'S LACTATE 30/500 ML
62.5 PLASTIC BAG, INJECTION (ML) INTRAVENOUS CONTINUOUS
Status: DISPENSED | OUTPATIENT
Start: 2018-08-02 | End: 2018-08-02

## 2018-08-02 RX ORDER — DEXAMETHASONE SODIUM PHOSPHATE 4 MG/ML
8 INJECTION, SOLUTION INTRA-ARTICULAR; INTRALESIONAL; INTRAMUSCULAR; INTRAVENOUS; SOFT TISSUE ONCE AS NEEDED
Status: DISPENSED | OUTPATIENT
Start: 2018-08-02 | End: 2018-08-03

## 2018-08-02 RX ORDER — ACETAMINOPHEN 325 MG/1
650 TABLET ORAL EVERY 6 HOURS PRN
Status: DISCONTINUED | OUTPATIENT
Start: 2018-08-02 | End: 2018-08-05 | Stop reason: HOSPADM

## 2018-08-02 RX ORDER — DIPHENHYDRAMINE HCL 25 MG
25 TABLET ORAL EVERY 6 HOURS PRN
Status: DISCONTINUED | OUTPATIENT
Start: 2018-08-02 | End: 2018-08-05 | Stop reason: HOSPADM

## 2018-08-02 RX ORDER — SODIUM CHLORIDE, SODIUM LACTATE, POTASSIUM CHLORIDE, CALCIUM CHLORIDE 600; 310; 30; 20 MG/100ML; MG/100ML; MG/100ML; MG/100ML
125 INJECTION, SOLUTION INTRAVENOUS CONTINUOUS
Status: DISCONTINUED | OUTPATIENT
Start: 2018-08-02 | End: 2018-08-05 | Stop reason: HOSPADM

## 2018-08-02 RX ORDER — OXYCODONE HYDROCHLORIDE AND ACETAMINOPHEN 5; 325 MG/1; MG/1
2 TABLET ORAL EVERY 4 HOURS PRN
Status: DISCONTINUED | OUTPATIENT
Start: 2018-08-03 | End: 2018-08-05 | Stop reason: HOSPADM

## 2018-08-02 RX ORDER — KETOROLAC TROMETHAMINE 30 MG/ML
30 INJECTION, SOLUTION INTRAMUSCULAR; INTRAVENOUS EVERY 6 HOURS SCHEDULED
Status: COMPLETED | OUTPATIENT
Start: 2018-08-02 | End: 2018-08-03

## 2018-08-02 RX ORDER — MORPHINE SULFATE 0.5 MG/ML
INJECTION, SOLUTION EPIDURAL; INTRATHECAL; INTRAVENOUS AS NEEDED
Status: DISCONTINUED | OUTPATIENT
Start: 2018-08-02 | End: 2018-08-02 | Stop reason: SURG

## 2018-08-02 RX ORDER — DIAPER,BRIEF,INFANT-TODD,DISP
1 EACH MISCELLANEOUS 4 TIMES DAILY PRN
Status: DISCONTINUED | OUTPATIENT
Start: 2018-08-02 | End: 2018-08-05 | Stop reason: HOSPADM

## 2018-08-02 RX ORDER — ONDANSETRON 2 MG/ML
4 INJECTION INTRAMUSCULAR; INTRAVENOUS ONCE AS NEEDED
Status: COMPLETED | OUTPATIENT
Start: 2018-08-02 | End: 2018-08-02

## 2018-08-02 RX ADMIN — FAMOTIDINE 10 MG: 20 TABLET ORAL at 21:08

## 2018-08-02 RX ADMIN — OXYTOCIN 250 MILLI-UNITS/MIN: 10 INJECTION, SOLUTION INTRAMUSCULAR; INTRAVENOUS at 08:40

## 2018-08-02 RX ADMIN — ONDANSETRON 4 MG: 2 INJECTION INTRAMUSCULAR; INTRAVENOUS at 08:44

## 2018-08-02 RX ADMIN — ALBUMIN HUMAN: 0.05 INJECTION, SOLUTION INTRAVENOUS at 08:54

## 2018-08-02 RX ADMIN — SODIUM CHLORIDE, SODIUM LACTATE, POTASSIUM CHLORIDE, AND CALCIUM CHLORIDE 125 ML/HR: .6; .31; .03; .02 INJECTION, SOLUTION INTRAVENOUS at 16:53

## 2018-08-02 RX ADMIN — KETOROLAC TROMETHAMINE 30 MG: 30 INJECTION, SOLUTION INTRAMUSCULAR at 23:32

## 2018-08-02 RX ADMIN — AZITHROMYCIN 500 MG: 500 INJECTION, POWDER, LYOPHILIZED, FOR SOLUTION INTRAVENOUS at 08:20

## 2018-08-02 RX ADMIN — METOCLOPRAMIDE 5 MG: 5 INJECTION, SOLUTION INTRAMUSCULAR; INTRAVENOUS at 10:53

## 2018-08-02 RX ADMIN — Medication 62.5 MILLI-UNITS/MIN: at 10:50

## 2018-08-02 RX ADMIN — SODIUM CHLORIDE, SODIUM LACTATE, POTASSIUM CHLORIDE, AND CALCIUM CHLORIDE 125 ML/HR: .6; .31; .03; .02 INJECTION, SOLUTION INTRAVENOUS at 07:31

## 2018-08-02 RX ADMIN — DEXAMETHASONE SODIUM PHOSPHATE 8 MG: 4 INJECTION, SOLUTION INTRAMUSCULAR; INTRAVENOUS at 10:30

## 2018-08-02 RX ADMIN — SODIUM CHLORIDE, SODIUM LACTATE, POTASSIUM CHLORIDE, AND CALCIUM CHLORIDE 125 ML/HR: .6; .31; .03; .02 INJECTION, SOLUTION INTRAVENOUS at 09:45

## 2018-08-02 RX ADMIN — ONDANSETRON 4 MG: 2 INJECTION, SOLUTION INTRAMUSCULAR; INTRAVENOUS at 09:49

## 2018-08-02 RX ADMIN — KETOROLAC TROMETHAMINE 30 MG: 30 INJECTION, SOLUTION INTRAMUSCULAR at 17:34

## 2018-08-02 RX ADMIN — BUPIVACAINE HYDROCHLORIDE IN DEXTROSE 1.6 ML: 7.5 INJECTION, SOLUTION SUBARACHNOID at 08:17

## 2018-08-02 RX ADMIN — KETOROLAC TROMETHAMINE 30 MG: 30 INJECTION, SOLUTION INTRAMUSCULAR at 08:44

## 2018-08-02 RX ADMIN — SODIUM CHLORIDE, SODIUM LACTATE, POTASSIUM CHLORIDE, AND CALCIUM CHLORIDE: .6; .31; .03; .02 INJECTION, SOLUTION INTRAVENOUS at 08:10

## 2018-08-02 RX ADMIN — MORPHINE SULFATE 0.25 MG: 0.5 INJECTION, SOLUTION EPIDURAL; INTRATHECAL; INTRAVENOUS at 08:17

## 2018-08-02 RX ADMIN — ONDANSETRON 4 MG: 2 INJECTION, SOLUTION INTRAMUSCULAR; INTRAVENOUS at 16:59

## 2018-08-02 RX ADMIN — NALBUPHINE HYDROCHLORIDE 5 MG: 10 INJECTION, SOLUTION INTRAMUSCULAR; INTRAVENOUS; SUBCUTANEOUS at 09:32

## 2018-08-02 RX ADMIN — PHENYLEPHRINE HYDROCHLORIDE 50 MCG/MIN: 10 INJECTION INTRAVENOUS at 08:19

## 2018-08-02 RX ADMIN — NALBUPHINE HYDROCHLORIDE 5 MG: 10 INJECTION, SOLUTION INTRAMUSCULAR; INTRAVENOUS; SUBCUTANEOUS at 17:00

## 2018-08-02 RX ADMIN — CEFAZOLIN SODIUM 2000 MG: 2 SOLUTION INTRAVENOUS at 08:10

## 2018-08-02 RX ADMIN — NALBUPHINE HYDROCHLORIDE 5 MG: 10 INJECTION, SOLUTION INTRAMUSCULAR; INTRAVENOUS; SUBCUTANEOUS at 21:54

## 2018-08-02 RX ADMIN — NALBUPHINE HYDROCHLORIDE 5 MG: 10 INJECTION, SOLUTION INTRAMUSCULAR; INTRAVENOUS; SUBCUTANEOUS at 12:39

## 2018-08-02 RX ADMIN — OXYCODONE HYDROCHLORIDE AND ACETAMINOPHEN 1 TABLET: 5; 325 TABLET ORAL at 14:18

## 2018-08-02 NOTE — LACTATION NOTE
This note was copied from a baby's chart  CONSULT - LACTATION  Baby Girl  (Raegan Needles) Fercho  0 days female MRN: [id number]    [facility] Room / Bed: (N)/(N) Encounter: [id number]    Maternal Information     MOTHER:  Viki Montano  Maternal Age: 25 y o    OB History: #: 1, Date: 13, Sex: Female, Weight: 3317 g (7 lb 5 oz), GA: 39w0d, Delivery: , Unspecified, Apgar1: None, Apgar5: None, Living: Living, Birth Comments: None    #: 2, Date: 18, Sex: Female, Weight: 3799 g (8 lb 6 oz), GA: 39w1d, Delivery: , Low Transverse, Apgar1: 8, Apgar5: 8, Living: Living, Birth Comments: None   Previouse breast reduction surgery? No    Lactation history:   Has patient previously breast fed: Yes   How long had patient previously breast fed: 4-5 months    Previous breast feeding complications: Low milk supply     Past Surgical History:   Procedure Laterality Date     SECTION      2013       Birth information:  YOB: 2020   Time of birth: 8:39 AM   Sex: female   Delivery type: , Low Transverse   Birth Weight: 3799 g (8 lb 6 oz)   Percent of Weight Change: 0%     Gestational Age: 36w3d   [unfilled]    Assessment     Breast and nipple assessment: normal assessment     Assessment: suck issue (not able to maintain a latch due to oral exhalation)    Feeding assessment: latch difficulty (not able to maintain a latch)  LATCH:  Latch: Repeated attempts, hold nipple in mouth, stimulate to suck   Audible Swallowing: A few with stimulation   Type of Nipple: Everted (After stimulation)   Comfort (Breast/Nipple): Soft/non-tender   Hold (Positioning): Full assist, teach one side, mother does other, staff holds   [facility] Score: 7          Feeding recommendations:  breast feed on demand     Infant sounds "stuffy" and was not able to latch due to exhaling out of her mouth    Oral anatomy appears normal  Afsaneh Bacon became nauseous and ended lactation consult to report vomiting to primary nurse  Discussed 2nd night syndrome and ways to calm infant  Hand out given  Information on hand expression given  Discussed benefits of knowing how to manually express breast including stimulating milk supply, softening nipple for latch and evacuating breast in the event of engorgement  Met with mother  Provided mother with Ready, Set, Baby booklet  Discussed Skin to Skin contact an benefits to mom and baby  Talked about the delay of the first bath until baby has adjusted  Spoke about the benefits of rooming in  Feeding on cue and what that means for recognizing infant's hunger  Avoidance of pacifiers for the first month discussed  Talked about exclusive breastfeeding for the first 6 months  Positioning and latch reviewed as well as showing images of other feeding positions  Discussed the properties of a good latch in any position  Reviewed hand/manual expression  Discussed s/s that baby is getting enough milk and some s/s that breastfeeding dyad may need further help  Gave information on common concerns, what to expect the first few weeks after delivery, preparing for other caregivers, and how partners can help  Resources for support also provided  [employer] MOB and FOB to call for assistance, questions and concerns  Extension number for inpatient lactation support provided          Coretta Clemente RN 8/2/2018 12:16 PM

## 2018-08-02 NOTE — OP NOTE
OPERATIVE REPORT  PATIENT NAME: Shaheen Carrera    :  1990  MRN: [id number]  Pt Location: [facility] L&D OR ROOM 02    SURGERY DATE: 2018    Surgeon(s) and Role:     * Jim Wood DO - Primary     * Lin Velez MD - Assisting    Preop Diagnosis:  Previous  section complicating pregnancy [H72 679]  PROM  Labor  Declines TOLAC    Post-Op Diagnosis Codes:     * Previous  section complicating pregnancy [F97 817]     *PROM     *Labor     * Declines TOLAC    Procedure(s) (LRB):   SECTION () REPEAT (N/A)    Specimen(s):  ID Type Source Tests Collected by Time Destination   A :  Cord Blood Cord BLOOD GAS, VENOUS, CORD, BLOOD GAS, ARTERIAL, CORD Jim Wood DO 2018 0840    B :  Tissue (Placenta on Hold) OB Only Placenta PLACENTA IN STORAGE Jim Wood DO 2018 9001        Estimated Blood Loss:   600ml    Drains:  Urethral Catheter Double-lumen; Latex 16 Fr  (Active)   Number of days: 0       Anesthesia Type:   spinal    Operative Indications:  Previous  section complicating pregnancy [A88 598]  Declines TOLAC    Operative Findings:  Nl tubes, ovaries; 2 cm fundal subserosal myoma    Complications:   None    Procedure and Technique:   Section Procedure Note      Pre-operative Diagnosis: 39 week 1 day pregnancy  Previous C/S, declines TOLAC             PROM w/ labor    Post-operative Diagnosis: same                                  Procedure(s) performed:  Repeat LTCS    Surgeon: Jim Wood DO      Assistant(s): Amor Martin    Findings:    Viable female weighing lbs oz;  Apgar scores of 8 at one minute and 8 at five minutes     clear amniotic fluid  Normal uterus with 2 cm subserosal myoma, tubes, and ovaries  Normal placenta with 3-vessel cord    Specimens: Arterial and venous cord gases, cord blood, segment of umbilical cord, placenta to routine storage     Estimated Blood Loss:  600 mL    Drains: urias, clear           Complications: None; patient tolerated the procedure well  Disposition: PACU            Condition: stable    Procedure Details     Patient admitted morning of scheduled C/S  Had PROM and now in labor  The patient was seen prior to the procedure  Risks, benefits, possible complications, alternate treatment options, and expected outcomes were discussed with the patient  The patient agreed with the proposed plan and gave informed consent  The patient was taken to [facility] Operating Room  She had already received appropriate spinal anesthesia  Fetal heart tones had been assessed and a Encarnacion catheter and SCDs were in place  Betadine vaginal prep was accomplished  The abdomen was prepped with Chloraprep and following appropriate drying time, the patient was draped in the usual sterile manner  A Time Out was held and the above information confirmed  The patient was identified as Graciela Emerson and the procedure verified as  Delivery  The patient had received Ancef /Azithro for prophylaxis  A Pfannenstiel incision was made and carried down through the underlying subcutaneous tissue to the fascia using a scalpel  Rectus fascia was then incised  We then proceeded in Praful-Samuels fashion  All anatomic layers were well-demarcated  The rectus muscles were  and the peritoneum was identified, entered, and extended longitudinally with blunt dissection  The bladder blade was inserted  A low transverse uterine incision was made with the scalpel and extended laterally with blunt dissection  The amnion was entered bluntly  The fetal head was palpated, elevated, and delivered through the uterine incision followed by the body without difficulty  Baby had spontaneous cry with good color and tone  Delayed cord clamping was employed  The umbilical cord was clamped and cut  The infant was handed off to the  providers  Pitocin infusion was begun     Arterial and venous cord gases, cord blood, and a segment of umbilical cord were obtained for evaluation  The placenta delivered spontaneously with uterine fundal massage and appeared normal  The uterus was exteriorized and curretted with a moist lap sponge  Uterus, tubes and ovaries appeared normal   There was a 2cm fundal subserosal myoma noted  The uterine incision was closed with a running locked suture of 0 Vicryl  A second layer of the same suture was used to imbricate the first   Hemostasis was noted to be excellent  The uterus was returned to the abdomen  The gutters were inspected and cleared of all clots and debris  The fascia was closed with a running suture of 0 Vicryl  Subcutaneous tissues were closed with 0 Vicryl suture     The skin was closed with surgical steel staples  Sterile dressing was applied  An abdominal binder was then placed    ApH:7 310      BE:-2 3           VpH:7 370        BE:-1 8    The patient appeared to tolerate the procedure very well  Lap sponge, needle, and instrument counts were correct x2  The patient was transferred to her postpartum recovery room in stable condition and her infant went to the  nursery  Attending Attestation: I was present for the entire procedure                   I was present for the entire procedure    Patient Disposition:  PACU     SIGNATURE: Gaurav Torres DO  DATE: 2018  TIME: 9:23 AM

## 2018-08-02 NOTE — PROGRESS NOTES
Post Op Check  Karla Pérez 25 y o  female MRN: [id number]  Unit/Bed#: -01 Encounter: [id number]    Subjective:  Pt reports N/V after eating lunch  Pt was able to tolerate crackers and soupfor a couple hours, but after eating her grilled cheese, that's when she vomited  Taking IV anti-emetic with relief  Pt states pain is controlled, but c/o itching  Breast feeding, has not been OOB  Vaginal bleeding appropriate  Motivated to have urias out tonight  /67 (BP Location: Right arm)   Pulse 70   Temp 97 9 °F (36 6 °C) (Oral)   Resp 18   Ht 5' 5" (1 651 m)   Wt 103 kg (227 lb)   LMP 11/01/2017   SpO2 96%   Breastfeeding? No   BMI 37 77 kg/m²     I/O this shift:  In: 1000 [I V :750; IV Piggyback:250]  Out: 4319 [Urine:375; Emesis/NG output:390; Blood:1000]    Lab Results   Component Value Date    WBC 9 52 08/02/2018    HGB 12 7 08/02/2018    HCT 40 6 08/02/2018    MCV 94 08/02/2018     08/02/2018       Lab Results   Component Value Date    CALCIUM 9 2 10/09/2017     10/09/2017    K 4 2 10/09/2017    CO2 30 10/09/2017     10/09/2017    BUN 15 10/09/2017    CREATININE 0 85 10/09/2017       Physical Exam  Gen:Sitting up in bed, breastfeeding patient  CVS:RRR, no m/r/g  Lungs:CTA b/l  Abdomen:Soft, non-tender, with abdominal binder in place  Incision: Pressure dressing in place with no bleeding noted on dressing  Extremities: Non-tender, SCDs on and on    A/P:POD #0 s/p RLTCS, stable  1)Continue routine post-op care  Urias in place, making adequate UOP   Discontinue tonight and f/u voiding trial  Pre-op hgb 12 7, f/u AM CBC  Diet: Advance as tolerated  Nause: IV zofran PRN  DVT PPx: SCDs  Encouraged incentive spirometry to reduce atelectasis and pneumonia risk  Encouraged ambulation as tolerated  2) Varicella immunity unknown: f/u titers    Jessica Hidalgo MD  OBGYN, PGY-2  8/2/2018 4:55 PM

## 2018-08-02 NOTE — ANESTHESIA PREPROCEDURE EVALUATION
Review of Systems/Medical History      History of anesthetic complications     Cardiovascular   Pulmonary       GI/Hepatic            Endo/Other     GYN       Hematology   Musculoskeletal       Neurology   Psychology   Depression ,              Physical Exam    Airway    Mallampati score: II         Dental   No notable dental hx     Cardiovascular      Pulmonary      Other Findings        Anesthesia Plan  ASA Score- 2     Anesthesia Type- spinal with ASA Monitors  Additional Monitors:   Airway Plan:     Comment: I, Dr Ignacia Claudio, the attending physician, have personally seen and evaluated the patient prior to anesthetic care  I have reviewed the pre-anesthetic record, and other medical records if appropriate to the anesthetic care  If a CRNA is involved in the case, I have reviewed the CRNA assessment, if present, and agree  The patient is in a suitable condition to proceed with my formulated anesthetic plan        Plan Factors-    Induction- intravenous  Postoperative Plan-     Informed Consent- Anesthetic plan and risks discussed with patient  I personally reviewed this patient with the CRNA  Discussed and agreed on the Anesthesia Plan with the CRNA  Praful Zelaya

## 2018-08-02 NOTE — DISCHARGE SUMMARY
Discharge Summary - Ashleigh Tim 25 y o  female MRN: [id number]    Unit/Bed#: LD PACU-03 Encounter: [id number]    Admission Date: 2018     Discharge Date: 2018    Admitting Diagnosis:   1  Pregnancy at 39w1d  2  History of prior c section  3  PROM  4  Varicella Immunity unknown    Discharge Diagnosis: same, delivered    Procedures: repeat  section, low transverse incision    Attending: Dr Sabrina Avendano Course:     Ashleigh Tim is a 25 y o  Helder Schilder at 39w1d wks who was initially admitted for repeat c section  On admission, pt was noted to be grossly ruptured, therefore she received 500mg IV azithromycin in addition to Ancef pre-operatively  She delivered a viable female  on 18 at [phone]  Weight 8lbs 6oz via repeat  section, low transverse incision  Apgars were 8 (1 min) and 8 (5 min)  Patient tolerated the procedure well and was transferred to recovery in stable condition  Her post-operative course was uncomplicated  Preoperative hemoglobin was 12 7, postoperative was 10 6  Her postoperative pain was well controlled with oral analgesics  On day of discharge, she was ambulating and able to reasonably perform all ADLs  She was voiding and had appropriate bowel function  Pain was well controlled  She was discharged home on post-operative day #3 without complications  Patient was instructed to follow up with her OB as an outpatient and was given appropriate warnings to call provider if she develops signs of infection or uncontrolled pain  Complications: none apparent    Condition at discharge: good      Discharge Medications:   Prenatal vitamin daily for 6 months or the duration of nursing whichever is longer    Motrin 600 mg orally every 6 hours as needed for pain  Tylenol (over the counter) per bottle directions as needed for pain  Hydrocortisone cream 1% (over the counter) applied 1-2x daily to hemorrhoids as needed  Witch hazel pads for hemorrhoidal discomfort as needed    Discharge instructions: See after visit summary for complete information  Do not place anything (no partner, tampons or douche) in your vagina for 6 weeks  You may walk for exercise for the first 6 weeks then gradually return to your usual activities     Please do not drive for 1 week if you have no stitches and for 2 weeks if you have stitches or underwent a  delivery     You may take baths or shower per your preference     Please look at your bust (breasts) in the mirror daily and call for redness or tenderness or increased warmth     If you have had a  please look at your incision daily as well and call us for increasing redness or steady drainage from the incision     Please call us for temperature > 100 4*F or 38* C, worsening pain or a foul discharge      Disposition: Home    Planned Readmission: No

## 2018-08-02 NOTE — DISCHARGE INSTRUCTIONS
Section   WHAT YOU SHOULD KNOW:   A  delivery, or , is abdominal surgery to deliver your baby  There are many reasons you may need a   · A  may be scheduled before labor if you had a  with your last baby  It may be scheduled if your baby is not positioned normally, or you are pregnant with more than 1 baby  · Your caregiver may perform an emergency  during labor to prevent life-threatening complications for you or your baby  A  may be done if your cervix does not dilate after several hours of active labor  · Other reasons for a  include maternal infections and problems with the placenta  AFTER YOU LEAVE:   Medicines:   · Prescription pain medicine  may be given  Ask how to take this medicine safely  · Acetaminophen  decreases pain and fever  It is available without a doctor's order  Ask how much to take and how often to take it  Follow directions  Acetaminophen can cause liver damage if not taken correctly  · NSAIDs  help decrease swelling and pain or fever  This medicine is available with or without a doctor's order  NSAIDs can cause stomach bleeding or kidney problems in certain people  If you take blood thinner medicine, always ask your obstetrician if NSAIDs are safe for you  Always read the medicine label and follow directions  · Take your medicine as directed  Contact your obstetrician (OB) if you think your medicine is not helping or if you have side effects  Tell him if you are allergic to any medicine  Keep a list of the medicines, vitamins, and herbs you take  Include the amounts, and when and why you take them  Bring the list or the pill bottles to follow-up visits  Carry your medicine list with you in case of an emergency  Follow up with your OB as directed: You may need to return to have your stitches or staples removed   Write down your questions so you remember to ask them during your visits  Wound care:  Carefully wash your wound with soap and water every day  Keep your wound clean and dry  Wear loose, comfortable clothes that do not rub against your wound  Ask your OB about bathing and showering  Drink plenty of liquids: You can lower your risk for a blood clot if you drink plenty of liquids  Ask how much liquid to drink each day and which liquids are best for you  Limit activity until you have fully recovered from surgery:   · Ask when it is safe for you to drive, walk up stairs, lift heavy objects, and have sex  · Ask when it is okay to exercise, and what types of exercise to do  Start slowly and do more as you get stronger  Contact your OB if:   · You have heavy vaginal bleeding that fills 1 or more sanitary pads in 1 hour  · You have a fever  · Your incision is swollen, red, or draining pus  · You have questions or concerns about yourself or your baby  Seek care immediately or call 911 if:   · Blood soaks through your bandage  · Your stitches come apart  · You feel lightheaded, short of breath, and have chest pain  · You cough up blood  · Your arm or leg feels warm, tender, and painful  It may look swollen and red  © 2014 [address] is for End User's use only and may not be sold, redistributed or otherwise used for commercial purposes  All illustrations and images included in CareNotes® are the copyrighted property of [employer]  or Shilo Rodriguez  The above information is an  only  It is not intended as medical advice for individual conditions or treatments  Talk to your doctor, nurse or pharmacist before following any medical regimen to see if it is safe and effective for you

## 2018-08-02 NOTE — PROGRESS NOTES
Progress Note  Maria Del Carmen Willoughby is a 24 y/o  who is here for scheduled repeat  at 0800 who presents grossly ruptured  Nitrazine, pooling, and ferning all positive  Patient not uncomfortable at this time and would like to proceed with repeat  as scheduled       Please refer to H&P for further details    Payal Spencer MD  2018  5:53 AM

## 2018-08-02 NOTE — ANESTHESIA PROCEDURE NOTES
Spinal Block    Patient location during procedure: OB  Start time: 8/2/2018 8:12 AM  End time: 8/2/2018 8:16 AM  Reason for block: at surgeon's request, post-op pain management and primary anesthetic  Staffing  Anesthesiologist: Sindhu Carolina  Performed: anesthesiologist   Preanesthetic Checklist  Completed: patient identified, site marked, surgical consent, pre-op evaluation, timeout performed, IV checked, risks and benefits discussed and monitors and equipment checked  Spinal Block  Patient position: sitting  Prep: Betadine  Patient monitoring: heart rate, continuous pulse ox, cardiac monitor and frequent blood pressure checks  Approach: midline  Location: L3-4  Injection technique: single-shot  Needle  Needle type: pencil-tip   Needle gauge: 25 G  Needle length: 10 cm  Assessment  Sensory level: T10  Injection Assessment:  negative aspiration for heme, no paresthesia on injection and positive aspiration for clear CSF    Post-procedure:  site cleaned
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
## 2018-08-03 LAB
BASOPHILS # BLD AUTO: 0.01 THOUSANDS/ΜL (ref 0–0.1)
BASOPHILS NFR BLD AUTO: 0 % (ref 0–1)
EOSINOPHIL # BLD AUTO: 0.01 THOUSAND/ΜL (ref 0–0.61)
EOSINOPHIL NFR BLD AUTO: 0 % (ref 0–6)
ERYTHROCYTE [DISTWIDTH] IN BLOOD BY AUTOMATED COUNT: 14 % (ref 11.6–15.1)
HCT VFR BLD AUTO: 33.9 % (ref 34.8–46.1)
HGB BLD-MCNC: 10.6 G/DL (ref 11.5–15.4)
IMM GRANULOCYTES # BLD AUTO: 0.07 THOUSAND/UL (ref 0–0.2)
IMM GRANULOCYTES NFR BLD AUTO: 1 % (ref 0–2)
LYMPHOCYTES # BLD AUTO: 1.36 THOUSANDS/ΜL (ref 0.6–4.47)
LYMPHOCYTES NFR BLD AUTO: 12 % (ref 14–44)
MCH RBC QN AUTO: 30 PG (ref 26.8–34.3)
MCHC RBC AUTO-ENTMCNC: 31.3 G/DL (ref 31.4–37.4)
MCV RBC AUTO: 96 FL (ref 82–98)
MONOCYTES # BLD AUTO: 1 THOUSAND/ΜL (ref 0.17–1.22)
MONOCYTES NFR BLD AUTO: 9 % (ref 4–12)
NEUTROPHILS # BLD AUTO: 8.71 THOUSANDS/ΜL (ref 1.85–7.62)
NEUTS SEG NFR BLD AUTO: 78 % (ref 43–75)
NRBC BLD AUTO-RTO: 0 /100 WBCS
PLATELET # BLD AUTO: 223 THOUSANDS/UL (ref 149–390)
PMV BLD AUTO: 9.8 FL (ref 8.9–12.7)
RBC # BLD AUTO: 3.53 MILLION/UL (ref 3.81–5.12)
WBC # BLD AUTO: 11.16 THOUSAND/UL (ref 4.31–10.16)

## 2018-08-03 PROCEDURE — 99024 POSTOP FOLLOW-UP VISIT: CPT | Performed by: OBSTETRICS & GYNECOLOGY

## 2018-08-03 PROCEDURE — 85025 COMPLETE CBC W/AUTO DIFF WBC: CPT | Performed by: OBSTETRICS & GYNECOLOGY

## 2018-08-03 RX ADMIN — SODIUM CHLORIDE, SODIUM LACTATE, POTASSIUM CHLORIDE, AND CALCIUM CHLORIDE 125 ML/HR: .6; .31; .03; .02 INJECTION, SOLUTION INTRAVENOUS at 01:17

## 2018-08-03 RX ADMIN — KETOROLAC TROMETHAMINE 30 MG: 30 INJECTION, SOLUTION INTRAMUSCULAR at 05:54

## 2018-08-03 RX ADMIN — IBUPROFEN 600 MG: 600 TABLET ORAL at 12:47

## 2018-08-03 RX ADMIN — IBUPROFEN 600 MG: 600 TABLET ORAL at 22:11

## 2018-08-03 RX ADMIN — OXYCODONE HYDROCHLORIDE AND ACETAMINOPHEN 2 TABLET: 5; 325 TABLET ORAL at 20:43

## 2018-08-03 RX ADMIN — ACETAMINOPHEN 650 MG: 325 TABLET, FILM COATED ORAL at 09:57

## 2018-08-03 RX ADMIN — DOCUSATE SODIUM 100 MG: 100 CAPSULE, LIQUID FILLED ORAL at 09:54

## 2018-08-03 RX ADMIN — Medication 1 TABLET: at 09:49

## 2018-08-03 RX ADMIN — OXYCODONE HYDROCHLORIDE AND ACETAMINOPHEN 1 TABLET: 5; 325 TABLET ORAL at 16:22

## 2018-08-03 RX ADMIN — DOCUSATE SODIUM 100 MG: 100 CAPSULE, LIQUID FILLED ORAL at 16:22

## 2018-08-03 RX ADMIN — FAMOTIDINE 10 MG: 20 TABLET ORAL at 20:43

## 2018-08-03 NOTE — PROGRESS NOTES
Progress Note - OB/GYN   Jourdan Duran 25 y o  female MRN: [id number]  Unit/Bed#:  316-01 Encounter: [id number]    Assessment:  Post-op Day #1 s/pRLTCS    Plan:  1  Post-op Hemoglobin: 10 6    2  Encarnacion out this morning and void trial   3  Continue routine post-operative care       Subjective: Patient is doing well  Her pain is well controlled with Tramadol       Pain: yes, 6/10, controlled with Tramadol  Tolerating PO: yes  Voiding: yes  Flatus: no  BM: no  Ambulating: yes  Breastfeeding:  Plans to, but struggling  Chest pain: no  Shortness of breath: no  Leg pain: no  Lochia: minimal    Objective:     Vitals: BP 92/57   Pulse 77   Temp 97 9 °F (36 6 °C) (Oral)   Resp 16   Ht 5' 5" (1 651 m)   Wt 103 kg (227 lb)   LMP 11/01/2017   SpO2 98%      Urine Output: 2625 over 8 hours (~328cc/hr)     Lab Results   Component Value Date    WBC 11 16 (H) 08/03/2018    HGB 10 6 (L) 08/03/2018    HCT 33 9 (L) 08/03/2018    MCV 96 08/03/2018     08/03/2018       Physical Exam:   Gen: AAOx3, NAD  CV: RRR  Lungs: CTA b/l  Abd: Soft, non-tender, non-distended, no rebound or guarding, incision clean, dry and intact, staples in place, dressing removed   Uterine fundus firm and non-tender  Ext: Non tender, swollen    Abdiaziz Lockhart MD  8/3/2018  6:42 AM

## 2018-08-03 NOTE — PROGRESS NOTES
Problem List Items Addressed This Visit        Other    Encounter for supervision of other normal pregnancy, third trimester - Primary     Plan for csection tomorrow with RK    No complaints at this time            History of  delivery

## 2018-08-04 PROCEDURE — 99024 POSTOP FOLLOW-UP VISIT: CPT | Performed by: OBSTETRICS & GYNECOLOGY

## 2018-08-04 RX ADMIN — IBUPROFEN 600 MG: 600 TABLET ORAL at 08:37

## 2018-08-04 RX ADMIN — FAMOTIDINE 10 MG: 20 TABLET ORAL at 08:38

## 2018-08-04 RX ADMIN — DOCUSATE SODIUM 100 MG: 100 CAPSULE, LIQUID FILLED ORAL at 20:06

## 2018-08-04 RX ADMIN — DOCUSATE SODIUM 100 MG: 100 CAPSULE, LIQUID FILLED ORAL at 08:37

## 2018-08-04 RX ADMIN — Medication 1 TABLET: at 08:37

## 2018-08-04 RX ADMIN — FAMOTIDINE 10 MG: 20 TABLET ORAL at 20:06

## 2018-08-04 RX ADMIN — IBUPROFEN 600 MG: 600 TABLET ORAL at 20:05

## 2018-08-04 RX ADMIN — OXYCODONE HYDROCHLORIDE AND ACETAMINOPHEN 1 TABLET: 5; 325 TABLET ORAL at 20:06

## 2018-08-04 NOTE — PROGRESS NOTES
Progress Note - OB/GYN   [facility] 25 y o  female MRN: [id number]  Unit/Bed#: -01 Encounter: [id number]    Assessment:  PP/POD#2 s/p Repeat low transverse  section, stable    Plan:  1  Continue routine post-partum care   - Encourage ambulation   - Encourage breastfeeding  2  Varicella titer pending  3  Dispo - anticipate discharge home tomorrow    Subjective/Objective   Chief Complaint:     PP/POD#2 s/p Repeat low transverse  section    Subjective:     Pain: yes, well controlled on PO meds  Tolerating PO: yes  Voiding: yes  Flatus: yes  BM: no  Ambulating: yes  Breastfeeding: Bottle feeding  Chest pain: no  Shortness of breath: no  Leg pain: no  Lochia: WNL    Objective:     Vitals:  Vitals:    18 0509 18 0752 18 1556 18 0021   BP: 92/57 140/63 141/61 132/75   BP Location:  Right arm Right arm    Pulse: 77 81 79 79   Resp: 16 18 20 20   Temp: 97 9 °F (36 6 °C) 97 9 °F (36 6 °C) 98 2 °F (36 8 °C) 98 6 °F (37 °C)   TempSrc: Oral Oral Oral Oral   SpO2: 98% 98%     Weight:       Height:           Physical Exam:     Physical Exam   Constitutional: She appears well-developed and well-nourished  No distress  Cardiovascular: Normal rate, regular rhythm and normal heart sounds  Pulmonary/Chest: Effort normal and breath sounds normal  No respiratory distress  She has no wheezes  Incision: clean/dry/intact    Uterine fundus firm and non-tender, at the umbilicus       Lab, Imaging and other studies: I have personally reviewed pertinent reports        Lab Results   Component Value Date    WBC 11 16 (H) 2018    HGB 10 6 (L) 2018    HCT 33 9 (L) 2018    MCV 96 2018     2018           Alis Murphy MD  18  7:44 AM

## 2018-08-04 NOTE — LACTATION NOTE
This note was copied from a baby's chart  Mom states infant has been cluster feeding  States pediatrician noted small tongue  Observed infant at breast in cradle hold  Good latch/positioning noted  Will start mom pumping later today when pump becomes available, and finger feeding any obtained colostrum in addition to nursing due to concerns about tongue

## 2018-08-05 VITALS
HEIGHT: 65 IN | BODY MASS INDEX: 37.82 KG/M2 | RESPIRATION RATE: 18 BRPM | OXYGEN SATURATION: 98 % | SYSTOLIC BLOOD PRESSURE: 126 MMHG | HEART RATE: 71 BPM | WEIGHT: 227 LBS | TEMPERATURE: 97.8 F | DIASTOLIC BLOOD PRESSURE: 79 MMHG

## 2018-08-05 RX ORDER — IBUPROFEN 600 MG/1
600 TABLET ORAL EVERY 6 HOURS PRN
Qty: 30 TABLET | Refills: 0
Start: 2018-08-05 | End: 2019-03-16

## 2018-08-05 RX ORDER — OXYCODONE HYDROCHLORIDE AND ACETAMINOPHEN 5; 325 MG/1; MG/1
1 TABLET ORAL EVERY 4 HOURS PRN
Qty: 15 TABLET | Refills: 0 | Status: SHIPPED | OUTPATIENT
Start: 2018-08-05 | End: 2018-08-15

## 2018-08-05 RX ADMIN — IBUPROFEN 600 MG: 600 TABLET ORAL at 08:16

## 2018-08-05 RX ADMIN — DOCUSATE SODIUM 100 MG: 100 CAPSULE, LIQUID FILLED ORAL at 08:16

## 2018-08-05 RX ADMIN — Medication 1 TABLET: at 08:19

## 2018-08-05 NOTE — PLAN OF CARE
DISCHARGE PLANNING     Discharge to home or other facility with appropriate resources Progressing        INFECTION - ADULT     Absence or prevention of progression during hospitalization Progressing     Absence of fever/infection during neutropenic period Progressing        Knowledge Deficit     Patient/family/caregiver demonstrates understanding of disease process, treatment plan, medications, and discharge instructions Progressing        PAIN - ADULT     Verbalizes/displays adequate comfort level or baseline comfort level Progressing        POSTPARTUM     Experiences normal postpartum course Progressing     Appropriate maternal -  bonding Progressing     Establishment of infant feeding pattern Progressing     Incision(s), wounds(s) or drain site(s) healing without S/S of infection Progressing        SAFETY ADULT     Patient will remain free of falls Progressing     Maintain or return to baseline ADL function Progressing     Maintain or return mobility status to optimal level Progressing          DISCHARGE PLANNING     Discharge to home or other facility with appropriate resources Progressing        INFECTION - ADULT     Absence or prevention of progression during hospitalization Progressing        Knowledge Deficit     Patient/family/caregiver demonstrates understanding of disease process, treatment plan, medications, and discharge instructions Progressing        PAIN - ADULT     Verbalizes/displays adequate comfort level or baseline comfort level Progressing        POSTPARTUM     Experiences normal postpartum course Progressing     Appropriate maternal -  bonding Progressing     Establishment of infant feeding pattern Progressing     Incision(s), wounds(s) or drain site(s) healing without S/S of infection Progressing        SAFETY ADULT     Patient will remain free of falls Progressing     Maintain or return to baseline ADL function Progressing     Maintain or return mobility status to optimal level Progressing

## 2018-08-05 NOTE — PROGRESS NOTES
Progress Note - OB/GYN   Karla Pérez 25 y o  female MRN: [id number]  Unit/Bed#: -01 Encounter: [id number]    Assessment:  PP/POD#3 s/p Repeat low transverse  section, stable    Plan:  1  Continue routine post-partum care   - Encourage ambulation   - Encourage breastfeeding  2  Varicella titer pending  3  Dispo - discharge home today    Subjective/Objective   Chief Complaint:     PP/POD#3 s/p Repeat low transverse  section    Subjective:     Pain: yes, well controlled on PO meds  Tolerating PO: yes  Voiding: yes  Flatus: yes  BM: yes  Ambulating: yes  Breastfeeding: Bottle feeding  Chest pain: no  Shortness of breath: no  Leg pain: no  Lochia: WNL    Objective:     Vitals:  Vitals:    18 0021 18 0845 18 1653 18 2352   BP: 132/75 139/71 142/78 149/72   BP Location:   Right arm Left arm   Pulse: 79 78 72 79   Resp: 20 20 18 18   Temp: 98 6 °F (37 °C) 98 2 °F (36 8 °C) 98 6 °F (37 °C) 97 8 °F (36 6 °C)   TempSrc: Oral Oral Oral Oral   SpO2:       Weight:       Height:           Physical Exam:     Physical Exam   Constitutional: She appears well-developed and well-nourished  No distress  Cardiovascular: Normal rate, regular rhythm and normal heart sounds  Pulmonary/Chest: Effort normal and breath sounds normal  No respiratory distress  She has no wheezes  Incision: clean/dry/intact    Uterine fundus firm and non-tender, at the umbilicus       Lab, Imaging and other studies: I have personally reviewed pertinent reports        Lab Results   Component Value Date    WBC 11 16 (H) 2018    HGB 10 6 (L) 2018    HCT 33 9 (L) 2018    MCV 96 2018     2018           Ni Michelle MD  18  7:40 AM

## 2018-08-07 LAB — VZV IGG SER IA-ACNC: NORMAL

## 2018-08-10 ENCOUNTER — POSTPARTUM VISIT (OUTPATIENT)
Dept: OBGYN CLINIC | Facility: MEDICAL CENTER | Age: 28
End: 2018-08-10

## 2018-08-10 VITALS — WEIGHT: 201 LBS | DIASTOLIC BLOOD PRESSURE: 78 MMHG | BODY MASS INDEX: 33.45 KG/M2 | SYSTOLIC BLOOD PRESSURE: 120 MMHG

## 2018-08-10 DIAGNOSIS — Z48.02 ENCOUNTER FOR STAPLE REMOVAL: Primary | ICD-10-CM

## 2018-08-10 PROCEDURE — 99024 POSTOP FOLLOW-UP VISIT: CPT | Performed by: OBSTETRICS & GYNECOLOGY

## 2018-08-10 NOTE — PROGRESS NOTES
Staples removed without difficulty  Incision is clean, dry, intact  Steri strips placed  Care instructions reviewed with patient  Dr Rockney Cushing here to assess incision as well

## 2018-08-14 LAB — PLACENTA IN STORAGE: NORMAL

## 2018-08-24 ENCOUNTER — TELEPHONE (OUTPATIENT)
Dept: OBGYN CLINIC | Age: 28
End: 2018-08-24

## 2018-08-24 NOTE — TELEPHONE ENCOUNTER
Pt cancelled her post op appointment with Dr Dorina Joseph on 8/24/18  Please call to reschedule   cv

## 2018-08-27 ENCOUNTER — POSTPARTUM VISIT (OUTPATIENT)
Dept: OBGYN CLINIC | Facility: MEDICAL CENTER | Age: 28
End: 2018-08-27

## 2018-08-27 VITALS — WEIGHT: 203.6 LBS | SYSTOLIC BLOOD PRESSURE: 138 MMHG | DIASTOLIC BLOOD PRESSURE: 82 MMHG | BODY MASS INDEX: 33.88 KG/M2

## 2018-08-27 DIAGNOSIS — Z30.09 ENCOUNTER FOR GENERAL COUNSELING ON PRESCRIPTION OF ORAL CONTRACEPTIVES: ICD-10-CM

## 2018-08-27 PROCEDURE — 99024 POSTOP FOLLOW-UP VISIT: CPT | Performed by: PHYSICIAN ASSISTANT

## 2018-08-27 RX ORDER — ACETAMINOPHEN AND CODEINE PHOSPHATE 120; 12 MG/5ML; MG/5ML
1 SOLUTION ORAL DAILY
Qty: 28 TABLET | Refills: 3 | Status: SHIPPED | OUTPATIENT
Start: 2018-08-27 | End: 2019-03-16

## 2018-08-27 NOTE — PROGRESS NOTES
Subjective Beryle Dolores is a 25 y o  female who presents for a postpartum visit  She is 3 weeks postpartum following a low transverse  section  I have fully reviewed the prenatal and intrapartum course  The delivery was at 39w1d gestational weeks  Outcome: repeat  section, low transverse incision  Anesthesia: spinal  Postpartum course has been uncomplicated  Baby's course has been significant only for jaundice  Baby is feeding by both breast and bottle  Bleeding staining only  Bowel function is normal  Bladder function is normal  Patient is not sexually active currently  Desired contraception method is oral progesterone-only contraceptive  Postpartum depression screening: EPDS - 3  The following portions of the patient's history were reviewed and updated as appropriate: allergies, current medications, past family history, past medical history, past social history, past surgical history and problem list     Review of Systems  Constitutional: negative  Gastrointestinal: negative  Genitourinary:negative for intermittent spotting  Behavioral/Psych: negative  Objective     /82 (BP Location: Right arm)   Wt 92 4 kg (203 lb 9 6 oz)   LMP 2017   Breastfeeding? Yes   BMI 33 88 kg/m²    General:  alert and oriented, in no acute distress   Abdomen: soft, non-tender; bowel sounds normal; no masses,  no organomegaly  Incision clean, dry and intact     Assessment/Plan     3 postpartum exam     1  Contraception: oral progesterone-only contraceptive  2   Follow up in: 3 months for annual

## 2018-08-27 NOTE — ASSESSMENT & PLAN NOTE
Progesterone only pill given while breastfeeding  Instructions, risks, and benefits reviewed  rx sent via EMR  RTO 3 months for annual

## 2019-02-18 ENCOUNTER — TELEPHONE (OUTPATIENT)
Dept: INTERNAL MEDICINE CLINIC | Facility: CLINIC | Age: 29
End: 2019-02-18

## 2019-03-16 ENCOUNTER — OFFICE VISIT (OUTPATIENT)
Dept: INTERNAL MEDICINE CLINIC | Facility: CLINIC | Age: 29
End: 2019-03-16
Payer: COMMERCIAL

## 2019-03-16 VITALS
BODY MASS INDEX: 29.52 KG/M2 | OXYGEN SATURATION: 99 % | SYSTOLIC BLOOD PRESSURE: 118 MMHG | HEART RATE: 100 BPM | DIASTOLIC BLOOD PRESSURE: 70 MMHG | HEIGHT: 65 IN | WEIGHT: 177.2 LBS

## 2019-03-16 DIAGNOSIS — R53.83 FATIGUE, UNSPECIFIED TYPE: Primary | ICD-10-CM

## 2019-03-16 DIAGNOSIS — E66.3 OVERWEIGHT: ICD-10-CM

## 2019-03-16 DIAGNOSIS — Z13.6 SCREENING FOR CARDIOVASCULAR CONDITION: ICD-10-CM

## 2019-03-16 DIAGNOSIS — L70.0 ACNE VULGARIS: ICD-10-CM

## 2019-03-16 PROBLEM — Z3A.37 37 WEEKS GESTATION OF PREGNANCY: Status: RESOLVED | Noted: 2018-07-18 | Resolved: 2019-03-16

## 2019-03-16 PROBLEM — Z34.83 ENCOUNTER FOR SUPERVISION OF OTHER NORMAL PREGNANCY, THIRD TRIMESTER: Status: RESOLVED | Noted: 2018-03-19 | Resolved: 2019-03-16

## 2019-03-16 PROBLEM — R53.82 CHRONIC FATIGUE: Status: RESOLVED | Noted: 2017-10-09 | Resolved: 2019-03-16

## 2019-03-16 PROBLEM — R00.2 PALPITATIONS: Status: RESOLVED | Noted: 2017-10-09 | Resolved: 2019-03-16

## 2019-03-16 PROBLEM — Z30.09 ENCOUNTER FOR GENERAL COUNSELING ON PRESCRIPTION OF ORAL CONTRACEPTIVES: Status: RESOLVED | Noted: 2018-08-27 | Resolved: 2019-03-16

## 2019-03-16 PROBLEM — Z98.891 HISTORY OF CESAREAN DELIVERY: Status: RESOLVED | Noted: 2018-03-19 | Resolved: 2019-03-16

## 2019-03-16 PROBLEM — Z98.891 S/P CESAREAN SECTION: Status: RESOLVED | Noted: 2018-08-02 | Resolved: 2019-03-16

## 2019-03-16 PROCEDURE — 3008F BODY MASS INDEX DOCD: CPT | Performed by: INTERNAL MEDICINE

## 2019-03-16 PROCEDURE — 99214 OFFICE O/P EST MOD 30 MIN: CPT | Performed by: INTERNAL MEDICINE
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
## 2019-03-18 ENCOUNTER — ANNUAL EXAM (OUTPATIENT)
Dept: OBGYN CLINIC | Facility: MEDICAL CENTER | Age: 29
End: 2019-03-18
Payer: COMMERCIAL

## 2019-03-18 VITALS
SYSTOLIC BLOOD PRESSURE: 120 MMHG | WEIGHT: 176 LBS | BODY MASS INDEX: 30.05 KG/M2 | DIASTOLIC BLOOD PRESSURE: 60 MMHG | HEIGHT: 64 IN

## 2019-03-18 DIAGNOSIS — R10.2 PELVIC PAIN IN FEMALE: ICD-10-CM

## 2019-03-18 DIAGNOSIS — Z01.419 ENCOUNTER FOR GYNECOLOGICAL EXAMINATION WITHOUT ABNORMAL FINDING: Primary | ICD-10-CM

## 2019-03-18 DIAGNOSIS — Z86.018 HISTORY OF UTERINE FIBROID: ICD-10-CM

## 2019-03-18 PROCEDURE — S0612 ANNUAL GYNECOLOGICAL EXAMINA: HCPCS | Performed by: OBSTETRICS & GYNECOLOGY
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
## 2019-03-22 RX ORDER — NORETHINDRONE ACETATE AND ETHINYL ESTRADIOL 1MG-20(21)
1 KIT ORAL DAILY
Qty: 28 TABLET | Refills: 4 | Status: SHIPPED | OUTPATIENT
Start: 2019-03-22 | End: 2019-09-27

## 2019-07-02 ENCOUNTER — OFFICE VISIT (OUTPATIENT)
Dept: DERMATOLOGY | Facility: CLINIC | Age: 29
End: 2019-07-02
Payer: COMMERCIAL

## 2019-07-02 DIAGNOSIS — L71.9 ROSACEA: Primary | ICD-10-CM

## 2019-07-02 DIAGNOSIS — Z13.89 SCREENING FOR SKIN CONDITION: ICD-10-CM

## 2019-07-02 DIAGNOSIS — D22.9 NEVUS: ICD-10-CM

## 2019-07-02 PROCEDURE — 99203 OFFICE O/P NEW LOW 30 MIN: CPT | Performed by: DERMATOLOGY

## 2019-07-02 RX ORDER — METRONIDAZOLE 7.5 MG/G
GEL TOPICAL DAILY
Qty: 45 G | Refills: 3 | Status: SHIPPED | OUTPATIENT
Start: 2019-07-02 | End: 2020-08-28 | Stop reason: ALTCHOICE

## 2019-07-02 NOTE — PATIENT INSTRUCTIONS
Rosacea discussed the concept of this process do not see typical comedonal acne at this point will go ahead and treat her with MetroGel daily and see if we get this under control  Neva reviewed the concept of ABCDE and ugly duckling nothing markedly atypical patient reassured  Screening for Dermatologic Disorders: Nothing else of concern noted on complete exam follow up in 2 year
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
## 2019-09-27 ENCOUNTER — OFFICE VISIT (OUTPATIENT)
Dept: OBGYN CLINIC | Facility: CLINIC | Age: 29
End: 2019-09-27
Payer: COMMERCIAL

## 2019-09-27 VITALS — DIASTOLIC BLOOD PRESSURE: 74 MMHG | SYSTOLIC BLOOD PRESSURE: 112 MMHG | BODY MASS INDEX: 24.89 KG/M2 | WEIGHT: 145 LBS

## 2019-09-27 DIAGNOSIS — N92.1 BREAKTHROUGH BLEEDING ON BIRTH CONTROL PILLS: Primary | ICD-10-CM

## 2019-09-27 DIAGNOSIS — Z30.41 SURVEILLANCE FOR BIRTH CONTROL, ORAL CONTRACEPTIVES: ICD-10-CM

## 2019-09-27 LAB — SL AMB POCT URINE HCG: NORMAL

## 2019-09-27 PROCEDURE — 99213 OFFICE O/P EST LOW 20 MIN: CPT | Performed by: OBSTETRICS & GYNECOLOGY

## 2019-09-27 PROCEDURE — 81025 URINE PREGNANCY TEST: CPT | Performed by: OBSTETRICS & GYNECOLOGY

## 2019-09-27 RX ORDER — NORETHINDRONE ACETATE AND ETHINYL ESTRADIOL 1.5-30(21)
1 KIT ORAL DAILY
Qty: 28 TABLET | Refills: 7 | Status: SHIPPED | OUTPATIENT
Start: 2019-09-27 | End: 2020-08-28 | Stop reason: ALTCHOICE
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
## 2019-10-28 ENCOUNTER — TELEPHONE (OUTPATIENT)
Dept: OBGYN CLINIC | Facility: CLINIC | Age: 29
End: 2019-10-28

## 2019-10-28 NOTE — TELEPHONE ENCOUNTER
Called pt- verified rx that was recently prescribed by dr Napoleon beck 1 5/30    Pt to call with any concerns, otherwise will f/u 3/2020

## 2019-10-28 NOTE — TELEPHONE ENCOUNTER
Pt  Was seen 9/27/19 with [facility],  She is wondering if her bc should be at a higher dosage as that's what pharm had for her prescription,    Pt was not aware of a higher dosage thought this was going to be lower,   pls call pt to advise

## 2020-01-08 ENCOUNTER — TELEPHONE (OUTPATIENT)
Dept: INTERNAL MEDICINE CLINIC | Facility: CLINIC | Age: 30
End: 2020-01-08

## 2020-08-28 ENCOUNTER — ANNUAL EXAM (OUTPATIENT)
Dept: OBGYN CLINIC | Facility: MEDICAL CENTER | Age: 30
End: 2020-08-28
Payer: COMMERCIAL

## 2020-08-28 VITALS
BODY MASS INDEX: 26.82 KG/M2 | SYSTOLIC BLOOD PRESSURE: 118 MMHG | TEMPERATURE: 98.2 F | DIASTOLIC BLOOD PRESSURE: 60 MMHG | WEIGHT: 161 LBS | HEIGHT: 65 IN

## 2020-08-28 DIAGNOSIS — Z01.419 ENCOUNTER FOR GYNECOLOGICAL EXAMINATION (GENERAL) (ROUTINE) WITHOUT ABNORMAL FINDINGS: Primary | ICD-10-CM

## 2020-08-28 DIAGNOSIS — Z12.4 CERVICAL CANCER SCREENING: ICD-10-CM

## 2020-08-28 DIAGNOSIS — Z11.51 SCREENING FOR HUMAN PAPILLOMAVIRUS (HPV): ICD-10-CM

## 2020-08-28 DIAGNOSIS — N92.6 LATE MENSES: ICD-10-CM

## 2020-08-28 LAB — SL AMB POCT URINE HCG: NORMAL

## 2020-08-28 PROCEDURE — G0145 SCR C/V CYTO,THINLAYER,RESCR: HCPCS | Performed by: OBSTETRICS & GYNECOLOGY

## 2020-08-28 PROCEDURE — S0612 ANNUAL GYNECOLOGICAL EXAMINA: HCPCS | Performed by: OBSTETRICS & GYNECOLOGY

## 2020-08-28 PROCEDURE — 87624 HPV HI-RISK TYP POOLED RSLT: CPT | Performed by: OBSTETRICS & GYNECOLOGY
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
## 2020-08-31 LAB
HPV HR 12 DNA CVX QL NAA+PROBE: NEGATIVE
HPV16 DNA CVX QL NAA+PROBE: NEGATIVE
HPV18 DNA CVX QL NAA+PROBE: NEGATIVE

## 2020-09-08 LAB
LAB AP GYN PRIMARY INTERPRETATION: NORMAL
Lab: NORMAL

## 2021-02-10 ENCOUNTER — TELEMEDICINE (OUTPATIENT)
Dept: INTERNAL MEDICINE CLINIC | Facility: CLINIC | Age: 31
End: 2021-02-10
Payer: COMMERCIAL

## 2021-02-10 DIAGNOSIS — M67.431 GANGLION CYST OF WRIST, RIGHT: ICD-10-CM

## 2021-02-10 DIAGNOSIS — L23.9 ALLERGIC CONTACT DERMATITIS, UNSPECIFIED TRIGGER: Primary | ICD-10-CM

## 2021-02-10 PROCEDURE — 99213 OFFICE O/P EST LOW 20 MIN: CPT | Performed by: INTERNAL MEDICINE

## 2021-02-10 PROCEDURE — 3725F SCREEN DEPRESSION PERFORMED: CPT | Performed by: INTERNAL MEDICINE

## 2021-02-10 RX ORDER — CLOBETASOL PROPIONATE 0.5 MG/G
OINTMENT TOPICAL 2 TIMES DAILY
Qty: 30 G | Refills: 0 | Status: SHIPPED | OUTPATIENT
Start: 2021-02-10 | End: 2021-03-10

## 2021-02-10 RX ORDER — PREDNISONE 10 MG/1
TABLET ORAL
Qty: 30 TABLET | Refills: 0 | Status: SHIPPED | OUTPATIENT
Start: 2021-02-10 | End: 2021-03-01
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
## 2021-03-01 ENCOUNTER — TELEMEDICINE (OUTPATIENT)
Dept: INTERNAL MEDICINE CLINIC | Facility: CLINIC | Age: 31
End: 2021-03-01
Payer: COMMERCIAL

## 2021-03-01 DIAGNOSIS — M79.10 MYALGIA: ICD-10-CM

## 2021-03-01 DIAGNOSIS — R53.83 FATIGUE, UNSPECIFIED TYPE: Primary | ICD-10-CM

## 2021-03-01 PROCEDURE — 99442 PR PHYS/QHP TELEPHONE EVALUATION 11-20 MIN: CPT | Performed by: INTERNAL MEDICINE
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
## 2021-03-03 ENCOUNTER — OFFICE VISIT (OUTPATIENT)
Dept: OBGYN CLINIC | Facility: MEDICAL CENTER | Age: 31
End: 2021-03-03
Payer: COMMERCIAL

## 2021-03-03 VITALS
WEIGHT: 164.2 LBS | DIASTOLIC BLOOD PRESSURE: 95 MMHG | HEART RATE: 98 BPM | SYSTOLIC BLOOD PRESSURE: 136 MMHG | HEIGHT: 65 IN | BODY MASS INDEX: 27.36 KG/M2

## 2021-03-03 DIAGNOSIS — G56.03 CARPAL TUNNEL SYNDROME, BILATERAL: Primary | ICD-10-CM

## 2021-03-03 DIAGNOSIS — R22.31 MASS OF RIGHT WRIST: ICD-10-CM

## 2021-03-03 DIAGNOSIS — G56.23 CUBITAL TUNNEL SYNDROME, BILATERAL: ICD-10-CM

## 2021-03-03 PROCEDURE — 99204 OFFICE O/P NEW MOD 45 MIN: CPT | Performed by: ORTHOPAEDIC SURGERY
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
## 2021-03-10 ENCOUNTER — OFFICE VISIT (OUTPATIENT)
Dept: INTERNAL MEDICINE CLINIC | Facility: CLINIC | Age: 31
End: 2021-03-10
Payer: COMMERCIAL

## 2021-03-10 ENCOUNTER — APPOINTMENT (OUTPATIENT)
Dept: LAB | Facility: CLINIC | Age: 31
End: 2021-03-10
Payer: COMMERCIAL

## 2021-03-10 VITALS
TEMPERATURE: 97.7 F | DIASTOLIC BLOOD PRESSURE: 78 MMHG | SYSTOLIC BLOOD PRESSURE: 128 MMHG | HEIGHT: 65 IN | WEIGHT: 165.8 LBS | BODY MASS INDEX: 27.63 KG/M2 | OXYGEN SATURATION: 99 % | HEART RATE: 88 BPM

## 2021-03-10 DIAGNOSIS — R53.83 FATIGUE, UNSPECIFIED TYPE: Primary | ICD-10-CM

## 2021-03-10 DIAGNOSIS — R21 RASH AND NONSPECIFIC SKIN ERUPTION: ICD-10-CM

## 2021-03-10 DIAGNOSIS — R53.83 FATIGUE, UNSPECIFIED TYPE: ICD-10-CM

## 2021-03-10 DIAGNOSIS — Z13.220 SCREENING CHOLESTEROL LEVEL: ICD-10-CM

## 2021-03-10 DIAGNOSIS — M25.50 PAIN IN JOINT, MULTIPLE SITES: ICD-10-CM

## 2021-03-10 LAB
25(OH)D3 SERPL-MCNC: 21.9 NG/ML (ref 30–100)
ALBUMIN SERPL BCP-MCNC: 3.4 G/DL (ref 3.5–5)
ALP SERPL-CCNC: 58 U/L (ref 46–116)
ALT SERPL W P-5'-P-CCNC: 70 U/L (ref 12–78)
ANION GAP SERPL CALCULATED.3IONS-SCNC: 6 MMOL/L (ref 4–13)
AST SERPL W P-5'-P-CCNC: 161 U/L (ref 5–45)
BASOPHILS # BLD AUTO: 0.01 THOUSANDS/ΜL (ref 0–0.1)
BASOPHILS NFR BLD AUTO: 0 % (ref 0–1)
BILIRUB SERPL-MCNC: 0.86 MG/DL (ref 0.2–1)
BUN SERPL-MCNC: 17 MG/DL (ref 5–25)
CALCIUM ALBUM COR SERPL-MCNC: 9.1 MG/DL (ref 8.3–10.1)
CALCIUM SERPL-MCNC: 8.6 MG/DL (ref 8.3–10.1)
CHLORIDE SERPL-SCNC: 109 MMOL/L (ref 100–108)
CHOLEST SERPL-MCNC: 165 MG/DL (ref 50–200)
CO2 SERPL-SCNC: 25 MMOL/L (ref 21–32)
CREAT SERPL-MCNC: 0.54 MG/DL (ref 0.6–1.3)
CRP SERPL QL: <3 MG/L
EOSINOPHIL # BLD AUTO: 0.06 THOUSAND/ΜL (ref 0–0.61)
EOSINOPHIL NFR BLD AUTO: 2 % (ref 0–6)
ERYTHROCYTE [DISTWIDTH] IN BLOOD BY AUTOMATED COUNT: 12.8 % (ref 11.6–15.1)
GFR SERPL CREATININE-BSD FRML MDRD: 125 ML/MIN/1.73SQ M
GLUCOSE P FAST SERPL-MCNC: 81 MG/DL (ref 65–99)
HCT VFR BLD AUTO: 39 % (ref 34.8–46.1)
HDLC SERPL-MCNC: 48 MG/DL
HGB BLD-MCNC: 12.5 G/DL (ref 11.5–15.4)
IMM GRANULOCYTES # BLD AUTO: 0.02 THOUSAND/UL (ref 0–0.2)
IMM GRANULOCYTES NFR BLD AUTO: 1 % (ref 0–2)
LDLC SERPL CALC-MCNC: 78 MG/DL (ref 0–100)
LYMPHOCYTES # BLD AUTO: 0.71 THOUSANDS/ΜL (ref 0.6–4.47)
LYMPHOCYTES NFR BLD AUTO: 17 % (ref 14–44)
MCH RBC QN AUTO: 30.6 PG (ref 26.8–34.3)
MCHC RBC AUTO-ENTMCNC: 32.1 G/DL (ref 31.4–37.4)
MCV RBC AUTO: 96 FL (ref 82–98)
MONOCYTES # BLD AUTO: 0.6 THOUSAND/ΜL (ref 0.17–1.22)
MONOCYTES NFR BLD AUTO: 15 % (ref 4–12)
NEUTROPHILS # BLD AUTO: 2.7 THOUSANDS/ΜL (ref 1.85–7.62)
NEUTS SEG NFR BLD AUTO: 65 % (ref 43–75)
NONHDLC SERPL-MCNC: 117 MG/DL
NRBC BLD AUTO-RTO: 0 /100 WBCS
PLATELET # BLD AUTO: 288 THOUSANDS/UL (ref 149–390)
PMV BLD AUTO: 10.3 FL (ref 8.9–12.7)
POTASSIUM SERPL-SCNC: 4 MMOL/L (ref 3.5–5.3)
PROT SERPL-MCNC: 6.5 G/DL (ref 6.4–8.2)
RBC # BLD AUTO: 4.08 MILLION/UL (ref 3.81–5.12)
SODIUM SERPL-SCNC: 140 MMOL/L (ref 136–145)
TRIGL SERPL-MCNC: 193 MG/DL
TSH SERPL DL<=0.05 MIU/L-ACNC: 1.77 UIU/ML (ref 0.36–3.74)
WBC # BLD AUTO: 4.1 THOUSAND/UL (ref 4.31–10.16)

## 2021-03-10 PROCEDURE — 86038 ANTINUCLEAR ANTIBODIES: CPT

## 2021-03-10 PROCEDURE — 86663 EPSTEIN-BARR ANTIBODY: CPT

## 2021-03-10 PROCEDURE — 86665 EPSTEIN-BARR CAPSID VCA: CPT

## 2021-03-10 PROCEDURE — 86430 RHEUMATOID FACTOR TEST QUAL: CPT

## 2021-03-10 PROCEDURE — 86664 EPSTEIN-BARR NUCLEAR ANTIGEN: CPT

## 2021-03-10 PROCEDURE — 80061 LIPID PANEL: CPT

## 2021-03-10 PROCEDURE — 80053 COMPREHEN METABOLIC PANEL: CPT

## 2021-03-10 PROCEDURE — 86140 C-REACTIVE PROTEIN: CPT

## 2021-03-10 PROCEDURE — 84443 ASSAY THYROID STIM HORMONE: CPT

## 2021-03-10 PROCEDURE — 82306 VITAMIN D 25 HYDROXY: CPT

## 2021-03-10 PROCEDURE — 99213 OFFICE O/P EST LOW 20 MIN: CPT | Performed by: NURSE PRACTITIONER

## 2021-03-10 PROCEDURE — 86618 LYME DISEASE ANTIBODY: CPT

## 2021-03-10 PROCEDURE — 85025 COMPLETE CBC W/AUTO DIFF WBC: CPT

## 2021-03-10 PROCEDURE — 3008F BODY MASS INDEX DOCD: CPT | Performed by: NURSE PRACTITIONER

## 2021-03-10 PROCEDURE — 36415 COLL VENOUS BLD VENIPUNCTURE: CPT

## 2021-03-10 NOTE — PROGRESS NOTES
INTERNAL MEDICINE FOLLOW-UP OFFICE VISIT  [facility] - [facility] SHANA RAMÍREZ    NAME: Emeka Duggan  AGE: 26 y o  SEX: female    DATE OF ENCOUNTER: 3/10/2021   Assessment and Plan:   Patient with multiple nonspecific complaints of diffuse joint pains, fatigue and skin discoloration/rash  Sounds more rheumatologic in nature  Will complete labs and follow up in a few days to review  Problem List Items Addressed This Visit        Other    Fatigue - Primary    Relevant Orders    CBC and differential    Comprehensive metabolic panel    TSH, 3rd generation with Free T4 reflex    RF Screen w/ Reflex to Titer    JUAN Screen w/ Reflex to Titer/Pattern    C-reactive protein    Lyme Antibody Profile with reflex to WB    EBV acute panel    Vitamin D 25 hydroxy      Other Visit Diagnoses     Rash and nonspecific skin eruption        Relevant Orders    RF Screen w/ Reflex to Titer    JUAN Screen w/ Reflex to Titer/Pattern    C-reactive protein    Lyme Antibody Profile with reflex to WB    EBV acute panel    Pain in joint, multiple sites        Relevant Orders    CBC and differential    Comprehensive metabolic panel    RF Screen w/ Reflex to Titer    JUAN Screen w/ Reflex to Titer/Pattern    C-reactive protein    Lyme Antibody Profile with reflex to WB    EBV acute panel    Screening cholesterol level        Relevant Orders    Lipid panel          No follow-ups on file  Counseling:     · Medication Side Effects - Adverse side effects of medications were reviewed with the patient/guardian today: Yes  · Counseling was given regarding: Prognosis, Risks and benefits of tx options, Intructions for management, Patient and family education, Importance of tx compliance, Risk factor reductions and Impressions    · Barriers to treatment include: No identified barriers      Chief Complaint:     Chief Complaint   Patient presents with    Joint Pain    Joint Swelling        History of Present Illness: Patient treated about one month ago for contact dermatitis rash of hands/arms with prednisone and clobetasol ointment  She then started to develop aches and pains in her joints all throughout the body  Pain is very bad in ankles and in upper body  Finds it hard to lift her arms to lift up her daughter  She also has a lot of fatigue  States it is difficulty just to make it to 8 pm  This has been ongoing for about 3 weeks now  States she has not changed any products to contribute to her rash  Did not improve much with prior treatment of prednisone  Patient does have cats, dogs, and chickens at home  States she does not touch the chickens  She also states that she has been exercising regularly now for a few years and trying to eat well with no recent changes in this  The following portions of the patient's history were reviewed and updated as appropriate: allergies, current medications, past family history, past medical history, past social history, past surgical history and problem list      Review of Systems:     Review of Systems   Constitutional: Positive for activity change and fatigue  Respiratory: Positive for shortness of breath  Negative for chest tightness  Gastrointestinal: Negative for constipation and diarrhea  Musculoskeletal: Positive for arthralgias, joint swelling and myalgias  Back pain: ankles, arms  Skin: Positive for color change and rash  Neurological: Positive for weakness and headaches  Psychiatric/Behavioral: Positive for dysphoric mood and sleep disturbance          Problem List:     Patient Active Problem List   Diagnosis    Fatigue    Overweight    Acne vulgaris    History of tobacco use    Other abnormal Papanicolaou smear of cervix and cervical HPV([id number])        Objective:     /78 (BP Location: Left arm, Patient Position: Sitting, Cuff Size: Standard)   Pulse 88   Temp 97 7 °F (36 5 °C)   Ht 5' 5" (1 651 m)   Wt 75 2 kg (165 lb 12 8 oz)   SpO2 99% BMI 27 59 kg/m²     Physical Exam  Vitals signs reviewed  Constitutional:       General: She is not in acute distress  Appearance: Normal appearance  She is well-developed, well-groomed and overweight  She is not diaphoretic  HENT:      Head: Normocephalic and atraumatic  Right Ear: Hearing, tympanic membrane, ear canal and external ear normal       Left Ear: Hearing, tympanic membrane, ear canal and external ear normal       Nose: Nose normal  No mucosal edema or rhinorrhea  Mouth/Throat:      Dentition: No dental caries  Pharynx: Uvula midline  No oropharyngeal exudate  Eyes:      General: Lids are normal          Right eye: No discharge  Left eye: No discharge  Conjunctiva/sclera: Conjunctivae normal       Pupils: Pupils are equal, round, and reactive to light  Neck:      Musculoskeletal: Normal range of motion and neck supple  Thyroid: No thyromegaly  Trachea: Trachea and phonation normal  No tracheal deviation  Cardiovascular:      Rate and Rhythm: Normal rate and regular rhythm  Pulses: Normal pulses  Heart sounds: Normal heart sounds  No murmur  Pulmonary:      Effort: Pulmonary effort is normal  No respiratory distress  Breath sounds: Normal breath sounds  No wheezing  Abdominal:      General: Bowel sounds are normal  There is no distension  Palpations: Abdomen is soft  There is no hepatomegaly or splenomegaly  Tenderness: There is no abdominal tenderness  Musculoskeletal: Normal range of motion  General: No tenderness  Lymphadenopathy:      Cervical: No cervical adenopathy  Skin:     General: Skin is warm and dry  Capillary Refill: Capillary refill takes less than 2 seconds  Findings: Erythema and rash present  Rash is macular  Neurological:      Mental Status: She is alert and oriented to person, place, and time  Sensory: No sensory deficit  Motor: Motor function is intact     Psychiatric: Attention and Perception: Attention normal          Mood and Affect: Mood normal          Speech: Speech normal          Behavior: Behavior normal  Behavior is cooperative  Thought Content: Thought content normal          Cognition and Memory: Cognition normal          Judgment: Judgment normal          Pertinent Laboratory/Diagnostic Studies:    Laboratory Results: I have personally reviewed the pertinent laboratory results/reports   Radiology/Other Diagnostic Testing Results: I have personally reviewed pertinent reports  Current Medications:     No current outpatient medications on file  No current facility-administered medications for this visit  There are no Patient Instructions on file for this visit      LINDA Suarez  [facility]

## 2021-03-10 NOTE — PATIENT INSTRUCTIONS
Rheumatoid factor measurement   GENERAL INFORMATION:  What is this test?  This test detects and measures the amount of rheumatoid factor antibody in blood  It is used to assess and manage rheumatoid arthritis or other autoimmune diseases  What are related tests? · Anti-cyclic citrullinated peptide antibody level  · C-reactive protein measurement  · Erythrocyte sedimentation rate, automated  Why do I need this test?  Laboratory tests may be done for many reasons  Tests are performed for routine health screenings or if a disease or toxicity is suspected  Lab tests may be used to determine if a medical condition is improving or worsening  Lab tests may also be used to measure the success or failure of a medication or treatment plan  Lab tests may be ordered for professional or legal reasons  You may need this test if you have:   · Juvenile idiopathic arthritis  · Rheumatoid arthritis  When and how often should I have this test?  When and how often laboratory tests are done may depend on many factors  The timing of laboratory tests may rely on the results or completion of other tests, procedures, or treatments  Lab tests may be performed immediately in an emergency, or tests may be delayed as a condition is treated or monitored  A test may be suggested or become necessary when certain signs or symptoms appear  Due to changes in the way your body naturally functions through the course of a day, lab tests may need to be performed at a certain time of day  If you have prepared for a test by changing your food or fluid intake, lab tests may be timed in accordance with those changes  Timing of tests may be based on increased and decreased levels of medications, drugs or other substances in the body  The age or gender of the person being tested may affect when and how often a lab test is required  Chronic or progressive conditions may need ongoing monitoring through the use of lab tests   Conditions that worsen and improve may also need frequent monitoring  Certain tests may be repeated to obtain a series of results, or tests may need to be repeated to confirm or disprove results  Timing and frequency of lab tests may vary if they are performed for professional or legal reasons  How should I get ready for the test?  Before having blood collected, tell the person drawing your blood if you are allergic to latex  Tell the healthcare worker if you have a medical condition or are using a medication or supplement that causes excessive bleeding  Also tell the healthcare worker if you have felt nauseated, lightheaded, or have fainted while having blood drawn in the past   How is the test done? When a blood sample from a vein is needed, a vein in your arm is usually selected  A tourniquet (large rubber strap) may be secured above the vein  The skin over the vein will be cleaned, and a needle will be inserted  You will be asked to hold very still while your blood is collected  Blood will be collected into one or more tubes, and the tourniquet will be removed  When enough blood has been collected, the healthcare worker will take the needle out  How will the test feel? The amount of discomfort you feel will depend on many factors, including your sensitivity to pain  Communicate how you are feeling with the person doing the test  Inform the person doing the test if you feel that you cannot continue with the test   During a blood draw, you may feel mild discomfort at the location where the blood sample is being collected  What should I do after the test?  After a blood sample is collected from your vein, a bandage, cotton ball, or gauze may be placed on the area where the needle was inserted  You may be asked to apply pressure to the area  Avoid strenuous exercise immediately after your blood draw  Contact your healthcare worker if you feel pain or see redness, swelling, or discharge from the puncture site  What are the risks?   Blood: During a blood draw, a hematoma (blood-filled bump under the skin) or slight bleeding from the puncture site may occur  After a blood draw, a bruise or infection may occur at the puncture site  The person doing this test may need to perform it more than once  Talk to your healthcare worker if you have any concerns about the risks of this test   What are normal results for this test?  Laboratory test results may vary depending on your age, gender, health history, the method used for the test, and many other factors  If your results are different from the results suggested below, this may not mean that you have a disease  Contact your healthcare worker if you have any questions  The following are considered to be normal results for this test:  · Adults (nephelometry): <30 International Units/mL (<30 kiloInternational Units/L)  · Adults (latex agglutination): Negative  · Adults (sheep cell agglutination test) (SCAT): Negative (< 1:16)  · Adults (radioimmunoassay): Negative  · Adults (enzyme linked immunosorbent assay): <30 International Units/mL (<30 kiloInternational Units/L)  What might affect my test results? · Results increased in:  ? Elderly patients  What follow up should I do after this test?  Ask your healthcare worker how you will be informed of the test results  You may be asked to call for results, schedule an appointment to discuss results, or notified of results by mail  Follow up care varies depending on many factors related to your test  Sometimes there is no follow up after you have been notified of test results  At other times follow up may be suggested or necessary  Some examples of follow up care include changes to medication or treatment plans, referral to a specialist, more or less frequent monitoring, and additional tests or procedures  Talk with your healthcare worker about any concerns or questions you have regarding follow up care or instructions  Where can I get more information?   Related Companies   ? Arthritis Foundation - ReportNation uy  org  ? [employer] Arthritis and Musculoskeletal and Skin Disease - FindLeather com au  [facility] gov    CARE AGREEMENT:   You have the right to help plan your care  To help with this plan, you must learn about your health condition and how it may be treated  You can then discuss treatment options with your caregivers  Work with them to decide what care may be used to treat you  You always have the right to refuse treatment  © Copyright [address]  Information is for End User's use only and may not be sold, redistributed or otherwise used for commercial purposes  The above information is an  only  It is not intended as a substitute for medical advice for your individual conditions or treatments  Talk to your doctor, nurse or pharmacist before following any medical regimen to see if it is safe and effective for you

## 2021-03-11 LAB
B BURGDOR IGG+IGM SER-ACNC: 16
EBV NA IGG SER IA-ACNC: 140 U/ML (ref 0–17.9)
EBV VCA IGG SER IA-ACNC: 88.5 U/ML (ref 0–17.9)
EBV VCA IGM SER IA-ACNC: <36 U/ML (ref 0–35.9)
INTERPRETATION: ABNORMAL
RHEUMATOID FACT SER QL LA: NEGATIVE

## 2021-03-12 ENCOUNTER — TELEPHONE (OUTPATIENT)
Dept: INTERNAL MEDICINE CLINIC | Facility: CLINIC | Age: 31
End: 2021-03-12

## 2021-03-12 ENCOUNTER — TELEMEDICINE (OUTPATIENT)
Dept: INTERNAL MEDICINE CLINIC | Facility: CLINIC | Age: 31
End: 2021-03-12
Payer: COMMERCIAL

## 2021-03-12 DIAGNOSIS — R53.83 FATIGUE, UNSPECIFIED TYPE: ICD-10-CM

## 2021-03-12 DIAGNOSIS — R21 RASH AND NONSPECIFIC SKIN ERUPTION: ICD-10-CM

## 2021-03-12 DIAGNOSIS — Z86.19 HISTORY OF EPSTEIN-BARR VIRUS INFECTION: ICD-10-CM

## 2021-03-12 DIAGNOSIS — M25.50 PAIN IN JOINT, MULTIPLE SITES: ICD-10-CM

## 2021-03-12 DIAGNOSIS — Z71.2 ENCOUNTER TO DISCUSS TEST RESULTS: Primary | ICD-10-CM

## 2021-03-12 LAB — RYE IGE QN: NEGATIVE

## 2021-03-12 PROCEDURE — 99212 OFFICE O/P EST SF 10 MIN: CPT | Performed by: NURSE PRACTITIONER

## 2021-03-12 PROCEDURE — 1036F TOBACCO NON-USER: CPT | Performed by: NURSE PRACTITIONER
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
## 2021-03-12 NOTE — TELEPHONE ENCOUNTER
----- Message from Manuel Aldridge sent at 3/12/2021  8:12 AM EST -----  Can patient make virtual follow up to discuss blood work results  Thanks

## 2021-03-12 NOTE — PATIENT INSTRUCTIONS
Mononucleosis   AMBULATORY CARE:   Mononucleosis (mono)  is an infection caused by a virus  Mono is spread through saliva  Common symptoms include the following:   · Extreme tiredness or weakness     · Fever    · Headache and muscle aches    · Sore throat or swollen tonsils    · Tender, swollen lymph nodes on the sides and back of your neck    · Night sweats    · Loss of appetite    Call 911 for any of the following:   · You have shortness of breath  · You are confused or have a seizure  Seek care immediately if:   · You have severe pain in your abdomen or shoulder  · You have trouble swallowing because of the pain  · You urinate very little or not at all  · Your arms or legs are weak  Contact your healthcare provider if:   · Your symptoms get worse, even after treatment  · You have questions or concerns about your condition or care  Medicines:   · Acetaminophen  decreases pain and fever  It is available without a doctor's order  Ask how much to take and how often to take it  Follow directions  Acetaminophen can cause liver damage if not taken correctly  · NSAIDs , such as ibuprofen, help decrease swelling, pain, and fever  This medicine is available with or without a doctor's order  NSAIDs can cause stomach bleeding or kidney problems in certain people  If you take blood thinner medicine, always ask your healthcare provider if NSAIDs are safe for you  Always read the medicine label and follow directions  · Steroids  help decrease inflammation  · Antibiotics  may be needed if you also have a bacterial infection  · Take your medicine as directed  Contact your healthcare provider if you think your medicine is not helping or if you have side effects  Tell him of her if you are allergic to any medicine  Keep a list of the medicines, vitamins, and herbs you take  Include the amounts, and when and why you take them  Bring the list or the pill bottles to follow-up visits   Carry your medicine list with you in case of an emergency  Self-care:   · Rest as needed  Slowly start to do more each day as you feel better  · Drink liquids as directed  Liquids will help prevent dehydration  Ask how much liquid to drink each day and which liquids are best for you  · Do not play sports or exercise for 3 to 4 weeks or as directed  When you return for your follow-up visit, your healthcare provider will tell you if you are able to return to full activity  Prevent the spread of mono:  Do not share food or drinks  Do not kiss anyone  The virus may be in your saliva for several months after you feel better  Wash your hands often  Use soap and water  Wash your hands after you use the bathroom, change a child's diapers, or sneeze  Wash your hands before you prepare or eat food  Follow up with your healthcare provider in 3 to 4 weeks:  Write down your questions so you remember to ask them during your visits  © Copyright [address] Information is for End User's use only and may not be sold, redistributed or otherwise used for commercial purposes  All illustrations and images included in CareNotes® are the copyrighted property of A D A M , Inc  or [facility] Luzma Nguyen   The above information is an  only  It is not intended as medical advice for individual conditions or treatments  Talk to your doctor, nurse or pharmacist before following any medical regimen to see if it is safe and effective for you

## 2021-04-05 ENCOUNTER — APPOINTMENT (OUTPATIENT)
Dept: LAB | Facility: CLINIC | Age: 31
End: 2021-04-05
Payer: COMMERCIAL

## 2021-04-05 ENCOUNTER — OFFICE VISIT (OUTPATIENT)
Dept: INTERNAL MEDICINE CLINIC | Facility: CLINIC | Age: 31
End: 2021-04-05
Payer: COMMERCIAL

## 2021-04-05 VITALS
DIASTOLIC BLOOD PRESSURE: 74 MMHG | BODY MASS INDEX: 26.56 KG/M2 | WEIGHT: 159.4 LBS | SYSTOLIC BLOOD PRESSURE: 122 MMHG | TEMPERATURE: 98.3 F | OXYGEN SATURATION: 96 % | HEIGHT: 65 IN | HEART RATE: 89 BPM

## 2021-04-05 DIAGNOSIS — R13.10 DIFFICULTY SWALLOWING LIQUIDS: ICD-10-CM

## 2021-04-05 DIAGNOSIS — R74.01 ELEVATED AST (SGOT): ICD-10-CM

## 2021-04-05 DIAGNOSIS — R13.10 DIFFICULTY SWALLOWING LIQUIDS: Primary | ICD-10-CM

## 2021-04-05 LAB
ALBUMIN SERPL BCP-MCNC: 3.8 G/DL (ref 3.5–5)
ALP SERPL-CCNC: 50 U/L (ref 46–116)
ALT SERPL W P-5'-P-CCNC: 34 U/L (ref 12–78)
ANION GAP SERPL CALCULATED.3IONS-SCNC: 4 MMOL/L (ref 4–13)
AST SERPL W P-5'-P-CCNC: 81 U/L (ref 5–45)
BILIRUB SERPL-MCNC: 0.76 MG/DL (ref 0.2–1)
BUN SERPL-MCNC: 9 MG/DL (ref 5–25)
CALCIUM SERPL-MCNC: 9.6 MG/DL (ref 8.3–10.1)
CHLORIDE SERPL-SCNC: 109 MMOL/L (ref 100–108)
CO2 SERPL-SCNC: 26 MMOL/L (ref 21–32)
CREAT SERPL-MCNC: 0.57 MG/DL (ref 0.6–1.3)
GFR SERPL CREATININE-BSD FRML MDRD: 122 ML/MIN/1.73SQ M
GLUCOSE SERPL-MCNC: 81 MG/DL (ref 65–140)
POTASSIUM SERPL-SCNC: 4.1 MMOL/L (ref 3.5–5.3)
PROT SERPL-MCNC: 7.2 G/DL (ref 6.4–8.2)
SODIUM SERPL-SCNC: 139 MMOL/L (ref 136–145)

## 2021-04-05 PROCEDURE — 82785 ASSAY OF IGE: CPT

## 2021-04-05 PROCEDURE — 80053 COMPREHEN METABOLIC PANEL: CPT

## 2021-04-05 PROCEDURE — 36415 COLL VENOUS BLD VENIPUNCTURE: CPT

## 2021-04-05 PROCEDURE — 99213 OFFICE O/P EST LOW 20 MIN: CPT | Performed by: NURSE PRACTITIONER

## 2021-04-05 PROCEDURE — 86003 ALLG SPEC IGE CRUDE XTRC EA: CPT

## 2021-04-05 NOTE — PROGRESS NOTES
INTERNAL MEDICINE FOLLOW-UP OFFICE VISIT  [facility] - [facility] [address] [place] [address]    NAME: Montana Pacheco  AGE: 33 y o  SEX: female    DATE OF ENCOUNTER: 4/6/2021   Assessment and Plan:   Patient with difficulty swallowing and regurgitation through the nose when drinking liquids  Will send to ENT for further evaluation  Will check allergy panel for reasons of postnasal drainage  Also rechecking CMP  Patient with elevated AST in the past month  Denies use of acetaminophen, statins or alcohol  Problem List Items Addressed This Visit     None      Visit Diagnoses     Difficulty swallowing liquids    -  Primary    Relevant Orders    [facility] Allergy Panel, Adult    Ambulatory Referral to Otolaryngology    Elevated AST (SGOT)        Relevant Orders    Comprehensive metabolic panel (Completed)          Return for Next scheduled follow up  Counseling:     · Medication Side Effects - Adverse side effects of medications were reviewed with the patient/guardian today: Yes  · Counseling was given regarding: Prognosis, Risks and benefits of tx options, Intructions for management, Patient and family education, Importance of tx compliance, Risk factor reductions and Impressions  · Barriers to treatment include: No identified barriers      Chief Complaint:     Chief Complaint   Patient presents with    Sore Throat     difficulty swallowing, gets choked when drinking to fast         History of Present Illness:     Patient with complaints of difficulty swallowing  States that she feels like food is getting stuck and so she has to keep a bottle of water near her every time she eats  States that sometimes when she drinks water it comes out of her nose  States this has happened occasionally if she laughed while drinking but now it is occurring at random and unprovoked  She denies a sore throat  She has no nasal congestion and states does not have to blow her nose, or sneeze   She does however feel like something is draining down the back of her throat  States her mother mentioned getting checked for allergies  Patient states she does not go outside much, does hot have any prior environmental allergies  States she is a very picky eater so she has not tried any new foods recently  The following portions of the patient's history were reviewed and updated as appropriate: allergies, current medications, past family history, past medical history, past social history, past surgical history and problem list      Review of Systems:     Review of Systems   Constitutional: Negative for chills, fatigue and fever  HENT: Positive for postnasal drip and trouble swallowing  Negative for congestion, dental problem, ear pain, hearing loss, rhinorrhea, sinus pressure, sinus pain, sneezing, sore throat and tinnitus  Eyes: Negative for photophobia  Respiratory: Positive for cough  Negative for chest tightness and shortness of breath  Cardiovascular: Negative for chest pain  Gastrointestinal: Negative for abdominal pain  Neurological: Negative for headaches  Problem List:     Patient Active Problem List   Diagnosis    Fatigue    Overweight    Acne vulgaris    History of tobacco use    Other abnormal Papanicolaou smear of cervix and cervical HPV(795 [id number])        Objective:     /74 (BP Location: Left arm, Patient Position: Sitting, Cuff Size: Standard)   Pulse 89   Temp 98 3 °F (36 8 °C) (Temporal) Comment: no nsaids  Ht 5' 5" (1 651 m)   Wt 72 3 kg (159 lb 6 4 oz)   SpO2 96%   BMI 26 53 kg/m²     Physical Exam  Vitals signs reviewed  Constitutional:       General: She is not in acute distress  Appearance: Normal appearance  She is well-developed, well-groomed and overweight  She is not ill-appearing  HENT:      Head: Normocephalic and atraumatic        Right Ear: Hearing, tympanic membrane, ear canal and external ear normal       Left Ear: Hearing, tympanic membrane, ear canal and external ear normal       Nose: Nose normal  No nasal tenderness, mucosal edema, congestion or rhinorrhea  Right Turbinates: Not enlarged or pale  Left Turbinates: Not enlarged or pale  Right Sinus: No maxillary sinus tenderness or frontal sinus tenderness  Left Sinus: No maxillary sinus tenderness or frontal sinus tenderness  Mouth/Throat:      Lips: Pink  Mouth: Mucous membranes are moist       Pharynx: Oropharynx is clear  No posterior oropharyngeal erythema  Tonsils: No tonsillar exudate  Neck:      Musculoskeletal: Full passive range of motion without pain and neck supple  Thyroid: No thyroid mass or thyromegaly  Trachea: Trachea normal  No tracheal tenderness  Cardiovascular:      Rate and Rhythm: Normal rate and regular rhythm  Heart sounds: Normal heart sounds, S1 normal and S2 normal    Pulmonary:      Effort: Pulmonary effort is normal  No accessory muscle usage  Breath sounds: Normal breath sounds  No wheezing  Lymphadenopathy:      Cervical: No cervical adenopathy  Skin:     General: Skin is warm and dry  Capillary Refill: Capillary refill takes less than 2 seconds  Findings: No rash  Neurological:      General: No focal deficit present  Mental Status: She is alert and oriented to person, place, and time  Motor: Motor function is intact  Psychiatric:         Attention and Perception: Attention and perception normal          Mood and Affect: Mood and affect normal          Speech: Speech normal          Behavior: Behavior normal  Behavior is cooperative  Thought Content: Thought content normal          Pertinent Laboratory/Diagnostic Studies:    Laboratory Results: I have personally reviewed the pertinent laboratory results/reports   Radiology/Other Diagnostic Testing Results: I have personally reviewed pertinent reports  Current Medications:     No current outpatient medications on file       No current facility-administered medications for this visit          Patient Instructions   Damon Colin 06) [address] [address]

## 2021-04-05 NOTE — PATIENT INSTRUCTIONS
Dr Matilde Romero, [phone]) [phone]  Aspiration Precautions   AMBULATORY CARE:   What you need to know about aspiration precautions:  Aspiration means that foods or fluids get into your airway  This can lead to trouble breathing or lung infections such as pneumonia  Aspiration precautions are practices that help prevent these problems  Seek care immediately if:   · You have chest pain  · You have shortness of breath  · You have signs or symptoms of dehydration, such as increased thirst, dark urine, or little or no urine  Contact your healthcare provider if:   · You have a cough, chills, or a fever  · You cough or choke before, during, or after you eat or drink  · You feel like you have to clear your throat after you eat or drink  · You lose weight without trying  · You have questions or concerns about your condition or care  Prevent aspiration:   · Eat in a chair or sit upright while you eat  This will help prevent choking  Stay upright for 45 minutes to 1 hour after you eat or drink  · Eat small amounts slowly  Do not eat or drink with a straw  Take small bites and chew well before you swallow  · Avoid distractions while you eat  Keep the radio and TV turned off during meals  Do not try to talk to others while you eat  · Make sure your dentures fit correctly  This will help you chew food into pieces that are easier to swallow  · Limit spicy foods and caffeine  These may cause reflux  Reflux is the movement of foods and fluids from your stomach into your esophagus  This could increase the risk that foods or fluids will also move into your airway  · Drink water with your meals  Water will help rinse food out of your mouth  This will decrease the risk that food will move into your airway  · Do not smoke  Nicotine and other chemicals in cigarettes and cigars can damage your esophagus and cause trouble swallowing   Ask your healthcare provider for information if you currently smoke and need help to quit  E-cigarettes or smokeless tobacco still contain nicotine  Talk to your healthcare provider before you use these products  What you need to know about nutrition and aspiration:  Your healthcare provider may show you how to thicken liquids or soften foods  Thickened liquids and soft foods are easier to swallow  A registered dietitian can help you plan your meals:  · Puree your foods as directed  This will help remove chunks or lumps  You can add gravy, sauce, vegetable juice, milk, or half and half to foods before you blend them  Your food should be the same consistency as pudding after you puree it  If your food is too thin after you puree it, thicken it as directed  The following are examples of foods that puree well into a pudding consistency:     ? Cream of wheat with small amounts of milk    ? Moistened breads, pancakes, Azeri pastries, or muffins    ? Well-cooked pasta, noodles, or rice    ? Cooked vegetables, tomato sauce, or cooked potatoes without skin    ? Casseroles, eggs, or cooked pureed meats    ? Margarine, sour cream, smooth cheese sauces, or strained gravy    · Thicken your foods and drinks as directed  Your food and drinks should be thickened to the consistency of pudding  You can add flour, cornstarch, or potato flakes, or thickening products to thicken your foods or drinks  Follow directions on the package when you add thickening products to your food or drinks  Your healthcare provider will give you a complete list of foods and drinks that need to be thickened  The following are examples of foods and drinks that should be thickened:     ? Milk, milkshakes, nutritional shakes, or sherbet    ? Juices without pulp or gelatin    ? Coffee, tea, or soda    ? Alcoholic beverages    · Keep a food diary  Write down everything you eat  Take the diary to your follow-up visits   This information will help your healthcare provider decide if you are getting enough nutrition  Follow up with your healthcare provider as directed:  Write down your questions so you remember to ask them during your visits  © Copyright [address] Information is for End User's use only and may not be sold, redistributed or otherwise used for commercial purposes  All illustrations and images included in CareNotes® are the copyrighted property of [employer]  or [facility] Luzma Nguyen   The above information is an  only  It is not intended as medical advice for individual conditions or treatments  Talk to your doctor, nurse or pharmacist before following any medical regimen to see if it is safe and effective for you

## 2021-04-06 DIAGNOSIS — J30.9 ALLERGIC RHINITIS, UNSPECIFIED SEASONALITY, UNSPECIFIED TRIGGER: Primary | ICD-10-CM

## 2021-04-07 ENCOUNTER — TELEPHONE (OUTPATIENT)
Dept: INTERNAL MEDICINE CLINIC | Facility: CLINIC | Age: 31
End: 2021-04-07

## 2021-04-07 LAB
A ALTERNATA IGE QN: <0.1 KUA/I
A FUMIGATUS IGE QN: <0.1 KUA/I
ALLERGEN COMMENT: ABNORMAL
BERMUDA GRASS IGE QN: 0.17 KUA/I
BOXELDER IGE QN: <0.1 KUA/I
C HERBARUM IGE QN: <0.1 KUA/I
CAT DANDER IGE QN: <0.1 KUA/I
CMN PIGWEED IGE QN: 0.17 KUA/I
COMMON RAGWEED IGE QN: 0.11 KUA/I
COTTONWOOD IGE QN: 0.15 KUA/I
D FARINAE IGE QN: 2.58 KUA/I
D PTERONYSS IGE QN: 4.78 KUA/I
DOG DANDER IGE QN: <0.1 KUA/I
LONDON PLANE IGE QN: <0.1 KUA/I
MOUSE URINE PROT IGE QN: <0.1 KUA/I
MT JUNIPER IGE QN: 0.23 KUA/I
MUGWORT IGE QN: <0.1 KUA/I
P NOTATUM IGE QN: <0.1 KUA/I
ROACH IGE QN: 1.27 KUA/I
SHEEP SORREL IGE QN: <0.1 KUA/I
SILVER BIRCH IGE QN: <0.1 KUA/I
TIMOTHY IGE QN: <0.1 KUA/I
TOTAL IGE SMQN RAST: 713 KU/L (ref 0–113)
WALNUT IGE QN: <0.1 KUA/I
WHITE ASH IGE QN: 0.11 KUA/I
WHITE ELM IGE QN: <0.1 KUA/I
WHITE MULBERRY IGE QN: <0.1 KUA/I
WHITE OAK IGE QN: <0.1 KUA/I

## 2021-04-07 NOTE — TELEPHONE ENCOUNTER
----- Message from Gilberto Mccauley [place] sent at 4/7/2021 10:04 AM EDT -----  Please let patient know that allergy panel came back that she does have significant allergies  The biggest trigger is dust mites, then cockroach, cedar trees, ragweed, bermuda grass, cottonwood, white jagruti tree  Would recommend follow up with the allergist    Liver enzyme is still elevated but improving  Thanks

## 2021-04-14 ENCOUNTER — OFFICE VISIT (OUTPATIENT)
Dept: OBGYN CLINIC | Facility: MEDICAL CENTER | Age: 31
End: 2021-04-14
Payer: COMMERCIAL

## 2021-04-14 VITALS
HEIGHT: 65 IN | HEART RATE: 92 BPM | DIASTOLIC BLOOD PRESSURE: 78 MMHG | SYSTOLIC BLOOD PRESSURE: 128 MMHG | BODY MASS INDEX: 26.49 KG/M2 | WEIGHT: 159 LBS

## 2021-04-14 DIAGNOSIS — R22.31 MASS OF RIGHT WRIST: ICD-10-CM

## 2021-04-14 DIAGNOSIS — G56.03 CARPAL TUNNEL SYNDROME, BILATERAL: Primary | ICD-10-CM

## 2021-04-14 DIAGNOSIS — G56.23 CUBITAL TUNNEL SYNDROME, BILATERAL: ICD-10-CM

## 2021-04-14 PROCEDURE — 1036F TOBACCO NON-USER: CPT | Performed by: ORTHOPAEDIC SURGERY

## 2021-04-14 PROCEDURE — 99213 OFFICE O/P EST LOW 20 MIN: CPT | Performed by: ORTHOPAEDIC SURGERY

## 2021-04-14 PROCEDURE — 3008F BODY MASS INDEX DOCD: CPT | Performed by: ORTHOPAEDIC SURGERY
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
## 2021-04-20 ENCOUNTER — HOSPITAL ENCOUNTER (OUTPATIENT)
Dept: RADIOLOGY | Facility: HOSPITAL | Age: 31
Discharge: HOME | End: 2021-04-20
Attending: OTOLARYNGOLOGY
Payer: COMMERCIAL

## 2021-04-20 DIAGNOSIS — R13.12 OROPHARYNGEAL DYSPHAGIA: ICD-10-CM

## 2021-04-20 PROCEDURE — 92611 MOTION FLUOROSCOPY/SWALLOW: CPT

## 2021-04-20 PROCEDURE — 74230 X-RAY XM SWLNG FUNCJ C+: CPT
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
## 2021-04-22 ENCOUNTER — TELEPHONE (OUTPATIENT)
Dept: NEUROLOGY | Facility: CLINIC | Age: 31
End: 2021-04-22

## 2021-04-22 NOTE — TELEPHONE ENCOUNTER
Best contact number for patient:[phone]    Emergency Contact name and number:None     Referring provider and telephone number:Dr Pito Harris 68  ENT     [facility]     Reason for Appointment/Dx:Dysphagia    Have you seen and followed up with a pediatric Neurologist for this disease in the past?  No        Neurology Location patient would like to be seen:[place]  Order received? Yes                                                Records Received? No     Have you ever seen another Neurologist?     No     Insurance Information    Insurance Name:[employer]     ID/Policy #:    Secondary Insurance:    ID/Policy#: Workman's Comp/ Accident/ School  Information      Workman's Comp/Accident/School related?      None  If yes name of Insurance company:    Claim #:    Date of Injury:    Type of Injury:     Name and Telephone Number:    Notes:Appointment schedule with patient new patient pack sent                    Appointment date: 06-04-21 2:30pm with Dr Lorenza Marcus

## 2021-04-22 NOTE — PROGRESS NOTES
Spoke with pt re: results of swallowing study- pt will be consulting with Neurology June 2021  She also saw Dr Alberto Bravo who diagnosed dermatomyositis  Will consider speech therapy for swallowing if necessary   Peyton [id number]

## 2021-06-04 ENCOUNTER — CONSULT (OUTPATIENT)
Dept: NEUROLOGY | Facility: CLINIC | Age: 31
End: 2021-06-04
Payer: COMMERCIAL

## 2021-06-04 VITALS
HEIGHT: 65 IN | BODY MASS INDEX: 22.28 KG/M2 | HEART RATE: 74 BPM | SYSTOLIC BLOOD PRESSURE: 130 MMHG | DIASTOLIC BLOOD PRESSURE: 65 MMHG | WEIGHT: 133.7 LBS

## 2021-06-04 DIAGNOSIS — M62.81 PROXIMAL LIMB MUSCLE WEAKNESS: ICD-10-CM

## 2021-06-04 DIAGNOSIS — M33.90 DERMATOMYOSITIS (HCC): ICD-10-CM

## 2021-06-04 DIAGNOSIS — R13.12 OROPHARYNGEAL DYSPHAGIA: Primary | ICD-10-CM

## 2021-06-04 PROBLEM — M33.13 DERMATOMYOSITIS (HCC): Status: ACTIVE | Noted: 2021-06-04

## 2021-06-04 PROCEDURE — 99205 OFFICE O/P NEW HI 60 MIN: CPT | Performed by: PSYCHIATRY & NEUROLOGY

## 2021-06-04 PROCEDURE — 3008F BODY MASS INDEX DOCD: CPT | Performed by: PSYCHIATRY & NEUROLOGY

## 2021-06-04 PROCEDURE — 1036F TOBACCO NON-USER: CPT | Performed by: PSYCHIATRY & NEUROLOGY
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
## 2021-06-14 ENCOUNTER — TELEPHONE (OUTPATIENT)
Dept: NEUROLOGY | Facility: CLINIC | Age: 31
End: 2021-06-14

## 2021-06-18 ENCOUNTER — APPOINTMENT (OUTPATIENT)
Dept: LAB | Facility: HOSPITAL | Age: 31
End: 2021-06-18
Attending: PSYCHIATRY & NEUROLOGY
Payer: COMMERCIAL

## 2021-06-18 DIAGNOSIS — M33.90 DERMATOMYOSITIS (HCC): ICD-10-CM

## 2021-06-18 DIAGNOSIS — M62.81 PROXIMAL LIMB MUSCLE WEAKNESS: ICD-10-CM

## 2021-06-18 DIAGNOSIS — R13.12 OROPHARYNGEAL DYSPHAGIA: Primary | ICD-10-CM

## 2021-06-18 LAB
CK SERPL-CCNC: 49 U/L (ref 26–192)
LDH SERPL-CCNC: 201 U/L (ref 81–234)

## 2021-06-18 PROCEDURE — 83615 LACTATE (LD) (LDH) ENZYME: CPT

## 2021-06-18 PROCEDURE — 83519 RIA NONANTIBODY: CPT

## 2021-06-18 PROCEDURE — 83516 IMMUNOASSAY NONANTIBODY: CPT

## 2021-06-18 PROCEDURE — 86255 FLUORESCENT ANTIBODY SCREEN: CPT

## 2021-06-18 PROCEDURE — 82550 ASSAY OF CK (CPK): CPT

## 2021-06-18 PROCEDURE — 36415 COLL VENOUS BLD VENIPUNCTURE: CPT

## 2021-06-18 PROCEDURE — 84238 ASSAY NONENDOCRINE RECEPTOR: CPT

## 2021-06-18 PROCEDURE — 83625 ASSAY OF LDH ENZYMES: CPT

## 2021-06-18 PROCEDURE — 86235 NUCLEAR ANTIGEN ANTIBODY: CPT

## 2021-06-18 PROCEDURE — 82085 ASSAY OF ALDOLASE: CPT

## 2021-06-18 PROCEDURE — 83520 IMMUNOASSAY QUANT NOS NONAB: CPT

## 2021-06-19 LAB — STRIA MUS AB TITR SER IF: NEGATIVE {TITER}

## 2021-06-21 LAB
ACHR BIND AB SER-SCNC: <0.03 NMOL/L (ref 0–0.24)
ALDOLASE SERPL-CCNC: 4.6 U/L (ref 3.3–10.3)

## 2021-06-22 LAB
ACHR BLOCK AB/ACHR TOTAL SFR SER: 15 % (ref 0–25)
LDH SERPL-CCNC: 198 IU/L (ref 119–226)
LDH1 CFR SERPL ELPH: 24 % (ref 17–32)
LDH2 CFR SERPL ELPH: 39 % (ref 25–40)
LDH3 CFR SERPL ELPH: 21 % (ref 17–27)
LDH4 CFR SERPL ELPH: 8 % (ref 5–13)
LDH5 CFR SERPL ELPH: 8 % (ref 4–20)

## 2021-06-24 ENCOUNTER — APPOINTMENT (OUTPATIENT)
Dept: LAB | Facility: CLINIC | Age: 31
End: 2021-06-24
Payer: COMMERCIAL

## 2021-06-24 DIAGNOSIS — M33.90 DERMATOMYOSITIS (HCC): ICD-10-CM

## 2021-06-24 LAB
ALBUMIN SERPL BCP-MCNC: 3.7 G/DL (ref 3.5–5)
ALP SERPL-CCNC: 29 U/L (ref 46–116)
ALT SERPL W P-5'-P-CCNC: 22 U/L (ref 12–78)
ANION GAP SERPL CALCULATED.3IONS-SCNC: 7 MMOL/L (ref 4–13)
AST SERPL W P-5'-P-CCNC: 23 U/L (ref 5–45)
BASOPHILS # BLD AUTO: 0.01 THOUSANDS/ΜL (ref 0–0.1)
BASOPHILS NFR BLD AUTO: 0 % (ref 0–1)
BILIRUB SERPL-MCNC: 1.22 MG/DL (ref 0.2–1)
BUN SERPL-MCNC: 15 MG/DL (ref 5–25)
CALCIUM SERPL-MCNC: 9.7 MG/DL (ref 8.3–10.1)
CHLORIDE SERPL-SCNC: 104 MMOL/L (ref 100–108)
CHOLEST SERPL-MCNC: 135 MG/DL (ref 50–200)
CK SERPL-CCNC: 55 U/L (ref 26–192)
CO2 SERPL-SCNC: 26 MMOL/L (ref 21–32)
CREAT SERPL-MCNC: 0.58 MG/DL (ref 0.6–1.3)
EOSINOPHIL # BLD AUTO: 0 THOUSAND/ΜL (ref 0–0.61)
EOSINOPHIL NFR BLD AUTO: 0 % (ref 0–6)
ERYTHROCYTE [DISTWIDTH] IN BLOOD BY AUTOMATED COUNT: 13.5 % (ref 11.6–15.1)
GFR SERPL CREATININE-BSD FRML MDRD: 121 ML/MIN/1.73SQ M
GLUCOSE SERPL-MCNC: 107 MG/DL (ref 65–140)
HCT VFR BLD AUTO: 41.4 % (ref 34.8–46.1)
HCV AB SER QL: NORMAL
HDLC SERPL-MCNC: 59 MG/DL
HGB BLD-MCNC: 13.1 G/DL (ref 11.5–15.4)
IMM GRANULOCYTES # BLD AUTO: 0.03 THOUSAND/UL (ref 0–0.2)
IMM GRANULOCYTES NFR BLD AUTO: 1 % (ref 0–2)
LDH SERPL-CCNC: 184 U/L (ref 81–234)
LDLC SERPL CALC-MCNC: 62 MG/DL (ref 0–100)
LDLC SERPL DIRECT ASSAY-MCNC: 67 MG/DL (ref 0–100)
LYMPHOCYTES # BLD AUTO: 0.41 THOUSANDS/ΜL (ref 0.6–4.47)
LYMPHOCYTES NFR BLD AUTO: 8 % (ref 14–44)
MCH RBC QN AUTO: 31.2 PG (ref 26.8–34.3)
MCHC RBC AUTO-ENTMCNC: 31.6 G/DL (ref 31.4–37.4)
MCV RBC AUTO: 99 FL (ref 82–98)
MONOCYTES # BLD AUTO: 0.22 THOUSAND/ΜL (ref 0.17–1.22)
MONOCYTES NFR BLD AUTO: 4 % (ref 4–12)
NEUTROPHILS # BLD AUTO: 4.55 THOUSANDS/ΜL (ref 1.85–7.62)
NEUTS SEG NFR BLD AUTO: 87 % (ref 43–75)
NONHDLC SERPL-MCNC: 76 MG/DL
NRBC BLD AUTO-RTO: 0 /100 WBCS
PLATELET # BLD AUTO: 244 THOUSANDS/UL (ref 149–390)
PMV BLD AUTO: 10 FL (ref 8.9–12.7)
POTASSIUM SERPL-SCNC: 4.1 MMOL/L (ref 3.5–5.3)
PROT SERPL-MCNC: 7.2 G/DL (ref 6.4–8.2)
RBC # BLD AUTO: 4.2 MILLION/UL (ref 3.81–5.12)
SODIUM SERPL-SCNC: 137 MMOL/L (ref 136–145)
TRIGL SERPL-MCNC: 72 MG/DL
WBC # BLD AUTO: 5.22 THOUSAND/UL (ref 4.31–10.16)

## 2021-06-24 PROCEDURE — 87389 HIV-1 AG W/HIV-1&-2 AB AG IA: CPT

## 2021-06-24 PROCEDURE — 86480 TB TEST CELL IMMUN MEASURE: CPT

## 2021-06-24 PROCEDURE — 80061 LIPID PANEL: CPT

## 2021-06-24 PROCEDURE — 86707 HEPATITIS BE ANTIBODY: CPT

## 2021-06-24 PROCEDURE — 36415 COLL VENOUS BLD VENIPUNCTURE: CPT

## 2021-06-24 PROCEDURE — 83615 LACTATE (LD) (LDH) ENZYME: CPT

## 2021-06-24 PROCEDURE — 86803 HEPATITIS C AB TEST: CPT

## 2021-06-24 PROCEDURE — 80053 COMPREHEN METABOLIC PANEL: CPT

## 2021-06-24 PROCEDURE — 82550 ASSAY OF CK (CPK): CPT

## 2021-06-24 PROCEDURE — 85025 COMPLETE CBC W/AUTO DIFF WBC: CPT

## 2021-06-24 PROCEDURE — 83721 ASSAY OF BLOOD LIPOPROTEIN: CPT

## 2021-06-25 ENCOUNTER — TELEPHONE (OUTPATIENT)
Dept: NEUROLOGY | Facility: CLINIC | Age: 31
End: 2021-06-25

## 2021-06-25 LAB
ACHR MOD AB/ACHR TOTAL SFR SER: <12 % (ref 0–20)
HBV E AB SERPL QL IA: NEGATIVE
HIV 1+2 AB+HIV1 P24 AG SERPL QL IA: NORMAL

## 2021-06-25 NOTE — TELEPHONE ENCOUNTER
Spoke with patient and offered sooner appt with Dr Shona Rojas for 7/8 @ 9:30 in CV - pt declines has another appt

## 2021-06-26 LAB — MUSK AB SER IA-ACNC: <1 U/ML

## 2021-06-27 LAB
GAMMA INTERFERON BACKGROUND BLD IA-ACNC: 0.04 IU/ML
M TB IFN-G BLD-IMP: ABNORMAL
M TB IFN-G CD4+ BCKGRND COR BLD-ACNC: -0.01 IU/ML
M TB IFN-G CD4+ BCKGRND COR BLD-ACNC: -0.01 IU/ML
MITOGEN IGNF BCKGRD COR BLD-ACNC: 0.33 IU/ML

## 2021-06-28 ENCOUNTER — HOSPITAL ENCOUNTER (OUTPATIENT)
Dept: MRI IMAGING | Facility: HOSPITAL | Age: 31
Discharge: HOME | End: 2021-06-28
Attending: PSYCHIATRY & NEUROLOGY
Payer: COMMERCIAL

## 2021-06-28 DIAGNOSIS — R13.12 OROPHARYNGEAL DYSPHAGIA: ICD-10-CM

## 2021-06-28 DIAGNOSIS — M33.90 DERMATOMYOSITIS (HCC): ICD-10-CM

## 2021-06-28 LAB — MISCELLANEOUS LAB TEST RESULT: NORMAL

## 2021-06-28 PROCEDURE — G1004 CDSM NDSC: HCPCS

## 2021-06-28 PROCEDURE — A9585 GADOBUTROL INJECTION: HCPCS | Performed by: PSYCHIATRY & NEUROLOGY

## 2021-06-28 PROCEDURE — 70553 MRI BRAIN STEM W/O & W/DYE: CPT

## 2021-06-28 RX ADMIN — GADOBUTROL 5 ML: 604.72 INJECTION INTRAVENOUS at 14:43

## 2021-07-01 ENCOUNTER — TELEPHONE (OUTPATIENT)
Dept: NEUROLOGY | Facility: CLINIC | Age: 31
End: 2021-07-01

## 2021-07-01 NOTE — TELEPHONE ENCOUNTER
Left message for patient to call back and schedule with Dr Vijaya Mathur ASAP per Dr Anand Posada - please schedule within residency days for dr Vijaya Mathur either 8/24 or 8/31 if still available  Appointment note should read :     LÁZARO/Consult only Approved from Dr Anand Posada to Dr Vijaya Mathur for dermatomyocytis

## 2021-07-05 LAB
MISCELLANEOUS LAB TEST RESULT: NORMAL
MISCELLANEOUS LAB TEST RESULT: NORMAL

## 2021-07-12 ENCOUNTER — OFFICE VISIT (OUTPATIENT)
Dept: INTERNAL MEDICINE CLINIC | Facility: CLINIC | Age: 31
End: 2021-07-12
Payer: COMMERCIAL

## 2021-07-12 VITALS
DIASTOLIC BLOOD PRESSURE: 72 MMHG | HEART RATE: 75 BPM | SYSTOLIC BLOOD PRESSURE: 118 MMHG | HEIGHT: 65 IN | TEMPERATURE: 97.8 F | BODY MASS INDEX: 22.13 KG/M2 | OXYGEN SATURATION: 99 % | WEIGHT: 132.8 LBS

## 2021-07-12 DIAGNOSIS — Z00.00 ENCOUNTER FOR WELLNESS EXAMINATION IN ADULT: Primary | ICD-10-CM

## 2021-07-12 PROBLEM — E66.3 OVERWEIGHT: Status: RESOLVED | Noted: 2017-10-09 | Resolved: 2021-07-12

## 2021-07-12 PROBLEM — R53.83 FATIGUE: Status: RESOLVED | Noted: 2017-10-09 | Resolved: 2021-07-12

## 2021-07-12 PROCEDURE — 99395 PREV VISIT EST AGE 18-39: CPT | Performed by: INTERNAL MEDICINE
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
## 2021-07-13 ENCOUNTER — TELEPHONE (OUTPATIENT)
Dept: NEUROLOGY | Facility: CLINIC | Age: 31
End: 2021-07-13

## 2021-07-14 ENCOUNTER — OFFICE VISIT (OUTPATIENT)
Dept: OBGYN CLINIC | Facility: MEDICAL CENTER | Age: 31
End: 2021-07-14
Payer: COMMERCIAL

## 2021-07-14 ENCOUNTER — PROCEDURE VISIT (OUTPATIENT)
Dept: NEUROLOGY | Facility: CLINIC | Age: 31
End: 2021-07-14
Payer: COMMERCIAL

## 2021-07-14 VITALS
HEIGHT: 65 IN | RESPIRATION RATE: 16 BRPM | WEIGHT: 130 LBS | BODY MASS INDEX: 21.66 KG/M2 | SYSTOLIC BLOOD PRESSURE: 111 MMHG | HEART RATE: 74 BPM | DIASTOLIC BLOOD PRESSURE: 70 MMHG

## 2021-07-14 DIAGNOSIS — G56.21 ENTRAPMENT OF RIGHT ULNAR NERVE AT WRIST: ICD-10-CM

## 2021-07-14 DIAGNOSIS — G56.23 CUBITAL TUNNEL SYNDROME, BILATERAL: Primary | ICD-10-CM

## 2021-07-14 DIAGNOSIS — R13.12 OROPHARYNGEAL DYSPHAGIA: ICD-10-CM

## 2021-07-14 DIAGNOSIS — G56.22 ENTRAPMENT OF LEFT ULNAR NERVE AT WRIST: ICD-10-CM

## 2021-07-14 DIAGNOSIS — M33.90 DERMATOMYOSITIS (HCC): ICD-10-CM

## 2021-07-14 PROCEDURE — 1036F TOBACCO NON-USER: CPT | Performed by: ORTHOPAEDIC SURGERY

## 2021-07-14 PROCEDURE — 95886 MUSC TEST DONE W/N TEST COMP: CPT | Performed by: PHYSICAL MEDICINE & REHABILITATION

## 2021-07-14 PROCEDURE — 99214 OFFICE O/P EST MOD 30 MIN: CPT | Performed by: ORTHOPAEDIC SURGERY

## 2021-07-14 PROCEDURE — 95912 NRV CNDJ TEST 11-12 STUDIES: CPT | Performed by: PHYSICAL MEDICINE & REHABILITATION
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
## 2021-07-14 NOTE — PROGRESS NOTES
EMG 1 Upper/1 Lower Neuropathy     Date/Time 7/14/2021 1:50 PM     Performed by  Staci Mcneal MD     Authorized by Salbador Walker MD      Universal Protocol   Consent: Verbal consent obtained  Risks and benefits: risks, benefits and alternatives were discussed  Consent given by: patient  Patient understanding: patient states understanding of the procedure being performed  Patient consent: the patient's understanding of the procedure matches consent given               EMG RIGHT UPPER AND LOWER EXTREMITY   73-year-old female presents with complaints of oropharyngeal dysphagia,  proximal weakness in the bilateral upper and lower extremities and multifocal dermatitis with joint pains  Patient was seen by Kris Valerio  and there was concern for dermatomyositis, hence she was started on prednisone  She has significant improvement  in her rash since then   She also continues to note tingling and numbness in both hands, primarily in the ring and small fingers which is constant during the day  Denies any back pain  Patient is being evaluated for a focal neuropathy versus myopathy  On physical examination, motor strength is  4/5 in the abductor pollicis brevis,  4+/ 5 bilateral hip flexion and knee extension, 5/5 bilateral ankle dorsiflexion  Sensations are intact to light touch and pinprick  bilaterally  Deep tendon reflexes are 2/4 throughout  Motor  and sensory conduction studies were performed on the right median, peroneal, tibial and ulnar nerves  The   Median motor terminal latency was prolonged at 4 5 milliseconds with normal compound motor action potential amplitude and normal conduction velocity across the wrist   The ulnar motor terminal latency was normal with a low compound motor action potential amplitude of 5 2 mV and normal conduction velocity distally but a slow conduction velocity of 38 m/sec across the elbow    The peroneal motor terminal latency while recording from the   extensor digitorum brevis was normal with a low compound motor action potential amplitude of 1 7 mV and a slow conduction velocity of 43 m/sec across the ankle but a normal conduction velocity across the fibular head  City across the fibular head  The peroneal motor terminal latency while recording from the tibialis anterior was normal with a low compound motor action potential amplitude of 2 2 mV and a normal conduction velocity   across the fibular head  Median and ulnar F waves were normal   The peroneal and tibial F-waves were normal     Median   sensory peak latency was prolonged at 4 2 milliseconds with a low sensory action potential amplitude of 8 microvolt  The radial sensory action potential was normal   The ulnar sensory peak latency was normal with a low sensory action potential amplitude of 2 microvolt  The sural sensory action potential was normal   The superficial peroneal sensory peak latency was normal with a low sensory action potential amplitude of 4 microvolt  Bilateral H soleus responses were normal     Concentric needle EMG was performed on various distal and proximal muscles of the right upper  and lowerextremity including APB, FDI, pronator teres, deltoid, biceps, triceps , low cervical paraspinal region,  gluteus medius,vastus lateralis, tibialis anterior, medial gastrocnemius, extensor digitorum brevis and lumbosacral paraspinals  There was no evidence of active denervation in any of the muscles tested  Early recruitment of brief duration, low amplitude and polyphasic motor unit  action potentials  were noted in the  deltoid,biceps, triceps, pronator teres, gluteus medius and vastus lateralis  The compound motor unit action potentials were of normal configuration with interference patterns being full or full for effort in the remaining muscles tested  IMPRESSION: Abnormal study  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

## 2021-07-15 ENCOUNTER — TELEPHONE (OUTPATIENT)
Dept: NEUROLOGY | Facility: CLINIC | Age: 31
End: 2021-07-15

## 2021-07-15 NOTE — TELEPHONE ENCOUNTER
Called and Left a message on pt's answering machine for a call back      Dr Marcella Pride - Per review of EMG results report (in media tab), right arm was tested

## 2021-07-15 NOTE — TELEPHONE ENCOUNTER
----- Message from Jermain Monson MD sent at 7/14/2021  8:19 PM EDT -----  Please review what side EMG was done on as I will send script for muscle and sural nerve biopsy of the opposite to EMG evaluation site   Patient also has CTS and moderate degree ulnar neuropathy, will require surgery evaluation

## 2021-07-15 NOTE — TELEPHONE ENCOUNTER
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

## 2021-07-23 NOTE — TELEPHONE ENCOUNTER
Called and Left a message on pt's answering machine for a call back         Dr Abimael Sanford  - following up on this encounter  Please place script for muscle and sural nerve biopsy  Thanks!

## 2021-07-23 NOTE — TELEPHONE ENCOUNTER
Patient returned my call while working on this encounter  Patient is agreeable to muscle biopsy  She is also working with her immunologist and orthopedist  Patient states her orthopedic doctor has suggested surgery as well  Patient looks forward to reviewing this information with you at her appt on 8/2/21

## 2021-07-26 ENCOUNTER — TELEPHONE (OUTPATIENT)
Dept: NEUROLOGY | Facility: CLINIC | Age: 31
End: 2021-07-26

## 2021-08-02 ENCOUNTER — OFFICE VISIT (OUTPATIENT)
Dept: NEUROLOGY | Facility: CLINIC | Age: 31
End: 2021-08-02
Payer: COMMERCIAL

## 2021-08-02 VITALS
BODY MASS INDEX: 22.29 KG/M2 | SYSTOLIC BLOOD PRESSURE: 121 MMHG | DIASTOLIC BLOOD PRESSURE: 65 MMHG | HEIGHT: 65 IN | WEIGHT: 133.8 LBS | HEART RATE: 72 BPM

## 2021-08-02 DIAGNOSIS — G56.11 MEDIAN NEUROPATHY, RIGHT: ICD-10-CM

## 2021-08-02 DIAGNOSIS — M33.90 DERMATOMYOSITIS (HCC): Primary | ICD-10-CM

## 2021-08-02 DIAGNOSIS — G56.21 ULNAR NEUROPATHY AT ELBOW, RIGHT: ICD-10-CM

## 2021-08-02 DIAGNOSIS — R29.898 RIGHT LEG WEAKNESS: ICD-10-CM

## 2021-08-02 PROCEDURE — 1036F TOBACCO NON-USER: CPT | Performed by: PSYCHIATRY & NEUROLOGY

## 2021-08-02 PROCEDURE — 99215 OFFICE O/P EST HI 40 MIN: CPT | Performed by: PSYCHIATRY & NEUROLOGY

## 2021-08-02 PROCEDURE — 3008F BODY MASS INDEX DOCD: CPT | Performed by: PSYCHIATRY & NEUROLOGY
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
## 2021-08-11 ENCOUNTER — OFFICE VISIT (OUTPATIENT)
Dept: OBGYN CLINIC | Facility: CLINIC | Age: 31
End: 2021-08-11
Payer: COMMERCIAL

## 2021-08-11 VITALS
HEIGHT: 65 IN | DIASTOLIC BLOOD PRESSURE: 69 MMHG | HEART RATE: 79 BPM | WEIGHT: 132 LBS | SYSTOLIC BLOOD PRESSURE: 122 MMHG | BODY MASS INDEX: 21.99 KG/M2 | RESPIRATION RATE: 16 BRPM

## 2021-08-11 DIAGNOSIS — G56.01 CARPAL TUNNEL SYNDROME ON RIGHT: ICD-10-CM

## 2021-08-11 DIAGNOSIS — G56.23 CUBITAL TUNNEL SYNDROME, BILATERAL: Primary | ICD-10-CM

## 2021-08-11 PROCEDURE — 99214 OFFICE O/P EST MOD 30 MIN: CPT | Performed by: SURGERY
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
## 2021-08-11 NOTE — PATIENT INSTRUCTIONS
What to Expect After Hand Surgery  Below are typical postoperative expectations and recommendations for our patients having hand/elbow surgery  Please understand, however, that every case and every patient are different so these recommendations are subject to change on an individual basis  Please be flexible if you are told something different on the day of surgery and understand that we do so with your best interest at heart  Postoperative care:    Elevate your hand above your elbow for 24-48 hours after surgery to help with swelling   Place your hand and arm over your head with motion at your shoulder three times a day   Do NOT apply any cream/ointment/oil to your incisions including antibiotic ointment, peroxide, etc     Do NOT soak your hands in standing water (dishwater, tubs, Jacuzzi's, pools, etc ) until given permission (typically 2-3 weeks after injury)   What to watch out for:    Increased numbness or tingling of your hand or fingers that is not relieved with elevation   Increasing pain that is not controlled with medication   Difficulty chewing, breathing, swallowing   Chest pains or shortness of breath   Fever over 101 4 degrees  Bandages:    Please keep bandages clean and dry, you will need to cover bandages with plastic bag or cast bag when showering  Any sutures will be removed in the office, please do not try and remove these on your own  Any steri-strips will fall off on their own or we will remove them in the office as well   For smaller procedures (carpal tunnel, trigger fingers, deQuervain's release, etc ) you will be able to remove the bandages 5-7 days from surgery  Once the bandages are removed you will be able to wash the hand and take short showers  Avoid soaking the wounds in any standing water (no dirty dishes, no pools/baths/hot tubs/ocean water, etc) until you are seen at your follow up visit      For fractures, tendon repairs, and other larger procedures we will typically have you keep the bandages on until we see you back in the office at your follow up visit  In some cases we may have you meet with occupational therapy before we see you back  If this is the case the therapist will remove your bandages for you and the above wound care instructions will apply   We emphasize that you do not put anything on the surgical wounds including neosporin, lotions, oils, peroxide, etc  These can delay wound healing and open you up to infections  Bandaids are okay in some cases, but should only be in place for a short time as they can hold moisture in and break down the wound  Motion and activity:    We encourage you to move any non-splinted fingers into a full tight fist as soon as possible after surgery unless otherwise specified by the surgical team  Hands get very stiff very quickly, so keep them moving!  Weight bearing status will depend on your surgery and will be specified in your postoperative instructions  Frequently we advise no lifting over 1-2 pounds with the operative hand, this allows you to perform activities of daily living (eating, brushing teeth/hair, etc), but also protects you from damaging the surgical site  In some cases we advise that you do not lift anything with the operative hand, but this will be specified in your instructions day of surgery   Depending on your job and what surgery you had done some patients can return to work shortly after surgery  Typically if your job involves heavy lifting, griping, or manual labor we advise that you do not work until we see you back for your first follow up visit  These jobs carry a high risk of surgical site infections and wound healing complications  Sling:    Use of a sling will be specified in your postoperative instructions      If you have a block done by anesthesia before surgery you will have limited use of the operative arm for around 24-48 hrs after and may require a sling to hold your arm up during that time  Once the block wears off you may discontinue use of the sling   Some surgeries do not require a block or a sling at all, this will be specified on day of surgery and in your postoperative instructions  Pain medication:    We will prescribe you a one-time script of narcotic pain medications for postoperative pain, the amount will depend on what surgery was done  In addition to this we typically recommend Tylenol and Ibuprofen/naproxen for pain control, these medications work best when alternated every 3-4 hours   Medications will vary between patients, so if you have any allergies or concerns please let us know and we can adjust our recommendations as needed   Please let us know if you already take narcotic pain medicine regularly or if you are already established with pain management  Often times in these cases you may have signed a pain agreement with the prescribing provider and we will need to discuss with them regarding your postoperative care  Follow-up:    Most follow up appointments are made when you schedule surgery, if you do not have a follow up by the time you schedule surgery please call the office to make an appointment  Typical follow up is 10-14 days from surgery unless otherwise specified

## 2021-08-16 ENCOUNTER — TELEPHONE (OUTPATIENT)
Dept: NEUROLOGY | Facility: CLINIC | Age: 31
End: 2021-08-16

## 2021-08-16 NOTE — TELEPHONE ENCOUNTER
Spoke with patient  Per Dr Robbie Maldonado request - patient is scheduled with Dr Robbie Maldonado on 8/19 @ 11a - [place] from Dr Melany Wright to Dr Robbie Maldonado Approved for dermatomyocytis

## 2021-08-19 ENCOUNTER — OFFICE VISIT (OUTPATIENT)
Dept: NEUROLOGY | Facility: CLINIC | Age: 31
End: 2021-08-19
Payer: COMMERCIAL

## 2021-08-19 VITALS
HEART RATE: 69 BPM | WEIGHT: 135 LBS | SYSTOLIC BLOOD PRESSURE: 124 MMHG | HEIGHT: 65 IN | BODY MASS INDEX: 22.49 KG/M2 | DIASTOLIC BLOOD PRESSURE: 61 MMHG

## 2021-08-19 DIAGNOSIS — M33.90 DERMATOMYOSITIS (HCC): Primary | ICD-10-CM

## 2021-08-19 PROCEDURE — 99215 OFFICE O/P EST HI 40 MIN: CPT | Performed by: PSYCHIATRY & NEUROLOGY

## 2021-08-19 RX ORDER — PREDNISONE 20 MG/1
20 TABLET ORAL DAILY
Qty: 30 TABLET | Refills: 1 | Status: SHIPPED | OUTPATIENT
Start: 2021-08-19 | End: 2021-09-18

## 2021-08-19 NOTE — PROGRESS NOTES
Patient ID: Beth Barriga is a 33 y o  female  Assessment/Plan:    Dermatomyositis ([facility] [address] )  This patient has had proximal muscle weakness since January of this year, in addition has had a rash in the malar region, around the knuckles, symptoms most consistent with a diagnosis of dermatomyositis  She has already been evaluated by immunology, did not have a skin biopsy, but was started on prednisone which she has been gradually tapering off  Prednisone did help the rash significantly and some of her muscle weakness as well as polyarthralgias  She reports symptoms are gradually getting worse again as she has been weaning off the prednisone  At this time, I would like to get a tissue diagnosis, recommended a muscle biopsy which will be arranged for her at the nearest future  I have given her a prescription for prednisone 20 mg daily, which she will start after her muscle biopsy  She likely will need long-term immunosuppressive medications, discussed other steroid sparing drug such as methotrexate, which we will start after her muscle biopsy  I will stay in touch with her, will contact her as I get the results of her muscle biopsy  She will return to see me in 3-4 months  Being on steroids, recommended GI prophylaxis with Pepcid, also calcium and vitamin-D  Thank you for allowing me to participate in her care  Diagnoses and all orders for this visit:    Dermatomyositis [facility])  -     Ambulatory referral to General Surgery; Future  -     predniSONE 20 mg tablet; Take 1 tablet (20 mg total) by mouth daily  -     CK (with reflex to MB); Future  -     Sedimentation rate, automated; Future  -     Aldolase; Future           Subjective:    HPI    I had the pleasure of seeing your patient in Neurology Clinic for neuromuscular consultation  As you know, she is a 78-year-old woman with dermatomyositis, 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   She has been followed by my colleague Dr Fallon Jones, and comes in today for neuromuscular consultation  Started having rash in the hands (around the knuckles) in Jan, was seen by PCP, was given steroid course for 10 days  Steroids did help the rash a little bit, after she finished steroids she started having joint pains, generalized fatigue, muscle weakness (picking up heavy objects)  Had to stop her regular exercise regimen  Was having some swallowing issues, was initially seen by ENT, and then by Dr Enma Sanchez in Immunology  Was diagnosed with dermatomyositis, started on steroids in April  Never had a biopsy  Swallowing still is not normal, only with certain foods, better than what it has before  Muscle are better, not normal, cannot hold her 1year old for long period  Can go up a flight of stairs  Can get up from a squatting without any issues, Can blow dry her hair without any weakness, hands do get numb  Breathing is ok, does have stiffness in joints, specially when she wakes up in the morning  The stiffness usually lasts half an hour  Has lost 30 lbs  Mostly change in diet as was recommended by immunology  Scheduled for CTS and cubital tunnel surgery in October  EMG July 2021, performed by Dr Rio Lopez:  Abnormal study, consistent with a myopathic process affecting the proximal muscles without any evidence of membrane instability  In addition, there was evidence to suggest a moderate carpal tunnel across the right wrist   And a possible ulnar neuropathy across the elbow  Patient has been followed by Rheumatology for dermatomyositis, has been on steroids since April, started on Prednisone 80 mg a day x 8 days, then 60 mg a day x 2 weeks,  40 mg a day x 2 weeks, 30 mg a day x 2 weeks, gradually tapering off, currtently on 5mg every other day  Labs:  June 2021:  QuantiFERON indeterminate  Hepatitis-B, C were negative, HIV negative  CK normal 49, 55    Striated muscle antibody, voltage gated calcium channel antibody, musk, acetyl choline receptor binding, blocking, and modulating antibodies were negative  Myositis panel was negative from [facility]  March 2021 TSH was negative, JUAN, rheumatoid factor were negative, CRP was normal, Lyme was negative  Vitamin-D level was 21 9    MRI brain June 2021 with and without contrast normal study, images personally reviewed by me as a part of this evaluation  She works as an  in an [address] [employer]  She is not a smoker  No alcohol  Has 2 girls (8 year and 3 years)  Family hx: Adopted, does not know much abt her family hx  The following portions of the patient's history were reviewed and updated as appropriate: allergies, current medications, past family history, past medical history, past social history, past surgical history and problem list          Objective:    Blood pressure 124/61, pulse 69, height 5' 5" (1 651 m), weight 61 2 kg (135 lb), currently breastfeeding  Physical Exam   On general examination, patient was not in any acute distress  HEENT   Did show the facial rash, mostly in the malar region, nose, some in the periorbital region  Also had the rash around the knuckles (gottron's rash), around the elbows  Extremities did not reveal any edema  Neurological Exam   Neurologically, patient is awake and alert  Speech was fluent without any dysarthria  Cranial examination did not reveal any ptosis, normal extraocular movements  normal strength of eye closure muscles  No facial weakness, she was able to puff out her cheeks well, and push her tongue into her cheeks well  Tongue was midline without atrophy or fasciculations  On motor examination, strength in neck flexors and extensor muscles was 5/5  In the upper extremities, strength was graded as 4+ at the deltoids, biceps and triceps were 5-, wrist extensors, flexors, interossei were 5/5 bilaterally  In the lower extremities, hip flexors were graded as 4, rest of the muscle groups were full throughout  Sensory examination was normal in all extremities, gait was casual and normal       ROS:  I reviewed the below ROS and what is mentioned in HPI, the remainder of ROS was negative  Review of Systems   Constitutional: Negative  Negative for appetite change and fever  HENT: Positive for trouble swallowing (For months)  Negative for hearing loss, tinnitus and voice change  Eyes: Negative  Negative for photophobia and pain  Respiratory: Negative  Negative for shortness of breath  Cardiovascular: Negative  Negative for palpitations  Gastrointestinal: Negative  Negative for nausea and vomiting  Endocrine: Negative  Negative for cold intolerance  Genitourinary: Negative  Negative for dysuria, frequency and urgency  Musculoskeletal: Positive for myalgias  Negative for neck pain  Skin: Negative  Negative for rash  Neurological: Positive for weakness and numbness  Negative for dizziness, tremors, seizures, syncope, facial asymmetry, speech difficulty, light-headedness and headaches  Hematological: Bruises/bleeds easily  Psychiatric/Behavioral: Positive for sleep disturbance  Negative for confusion and hallucinations

## 2021-08-19 NOTE — PROGRESS NOTES
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  She has been followed by my colleague Dr Veronica Herrera, and comes in today for neuromuscular consultation  EMG July 2021, performed by Dr Christopher Laureano:  Abnormal study, consistent with a myopathic process affecting the proximal muscles without any evidence of membrane instability  In addition, there was evidence to suggest a moderate carpal tunnel across the right wrist   And a possible ulnar neuropathy across the elbow  Patient has been followed by Rheumatology for dermatomyositis, has been on steroids since April, started on Prednisone 80 mg a day x 8 days, then 60 mg a day x 2 weeks,  40 mg a day x 2 weeks, 30 mg a day x 2 weeks, now on 20 mg a day   Labs:  June 2021:  QuantiFERON indeterminate  Hepatitis-B, C were negative, HIV negative  CK normal 49, 55  Striated muscle antibody, voltage gated calcium channel antibody, musk, acetyl choline receptor binding, blocking, and modulating antibodies were negative  Myositis panel was negative from [facility]  March 2021 TSH was negative, JUAN, rheumatoid factor were negative, CRP was normal, Lyme was negative  Vitamin-D level was 21 9    MRI brain June 2021 with and without contrast normal study, images personally reviewed by me as a part of this evaluation

## 2021-08-19 NOTE — ASSESSMENT & PLAN NOTE
This patient has had proximal muscle weakness since January of this year, in addition has had a rash in the malar region, around the knuckles, symptoms most consistent with a diagnosis of dermatomyositis  She has already been evaluated by immunology, did not have a skin biopsy, but was started on prednisone which she has been gradually tapering off  Prednisone did help the rash significantly and some of her muscle weakness as well as polyarthralgias  She reports symptoms are gradually getting worse again as she has been weaning off the prednisone  At this time, I would like to get a tissue diagnosis, recommended a muscle biopsy which will be arranged for her at the nearest future  I have given her a prescription for prednisone 20 mg daily, which she will start after her muscle biopsy  She likely will need long-term immunosuppressive medications, discussed other steroid sparing drug such as methotrexate, which we will start after her muscle biopsy  I will stay in touch with her, will contact her as I get the results of her muscle biopsy  She will return to see me in 3-4 months  Being on steroids, recommended GI prophylaxis with Pepcid, also calcium and vitamin-D  Thank you for allowing me to participate in her care

## 2021-08-24 ENCOUNTER — CONSULT (OUTPATIENT)
Dept: SURGERY | Facility: CLINIC | Age: 31
End: 2021-08-24
Payer: COMMERCIAL

## 2021-08-24 ENCOUNTER — PREP FOR PROCEDURE (OUTPATIENT)
Dept: SURGERY | Facility: CLINIC | Age: 31
End: 2021-08-24

## 2021-08-24 VITALS — HEIGHT: 65 IN | BODY MASS INDEX: 21.66 KG/M2 | WEIGHT: 130 LBS

## 2021-08-24 DIAGNOSIS — M79.10 MYALGIA: Primary | ICD-10-CM

## 2021-08-24 DIAGNOSIS — R29.898 RIGHT LEG WEAKNESS: Primary | ICD-10-CM

## 2021-08-24 DIAGNOSIS — M33.90 DERMATOMYOSITIS (HCC): ICD-10-CM

## 2021-08-24 PROCEDURE — 1036F TOBACCO NON-USER: CPT | Performed by: SURGERY

## 2021-08-24 PROCEDURE — 3008F BODY MASS INDEX DOCD: CPT | Performed by: SURGERY

## 2021-08-24 PROCEDURE — 99202 OFFICE O/P NEW SF 15 MIN: CPT | Performed by: SURGERY
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
## 2021-09-01 NOTE — DISCHARGE INSTRUCTIONS
Please call the office when you leave to schedule an appointment for 2 weeks  This can be a virtual / telephone consultation or it can be in person  The choice is yours  Please call [phone]   [address], [place], [address]  [address] of [address] between [facility] and [facility]  Activity:    May lift 10 lb as many times as desired the 1st week,       20 lb in 2 weeks,       30 lb in 3 weeks  Walking is encouraged  Normal daily activities including climbing steps are okay  Do not engage in strenuous activity ( sit-ups or crutches) or contact sports for 4-6 weeks post-operatively    Return to Work:   Okay to return to work when you feel well if you desire  Diet:   You may return to your normal healthy diet  Wound Care: Your wound is closed with dissolvable stitches and glue  It is okay to shower  Wash incision gently with soap and water and pat dry  Do not soak incisions in bath water or swim for two weeks  Do not apply any creams or ointments  Pain Medication:   Please take as directed if needed  May use Advil or Motrin in addition  Recall, the pain medicine and anesthesia is associated with constipation  No driving while taking narcotic pain medications  Other: It is normal to developed a healing ridge / firm incision after surgery  This is your body making scar tissue  It is a good sign  Constipation is very common after general anesthesia  Please use milk of magnesia as needed in order to help prevent constipation  It is normal to get bruising after surgery  If you have questions after discharge please call the office      If you have increased pain, fever >101 5, increased drainage, redness or a bad smell at your surgery site, please call us immediately or come directly to the Emergency Room

## 2021-09-02 NOTE — PRE-PROCEDURE INSTRUCTIONS
Pre-Surgery Instructions:   Medication Instructions    Calcium Carb-Cholecalciferol (OSCAL-D) 500 mg-200 units per tablet pt instructed to stop prior to surgery     fluticasone (FLONASE) 50 mcg/act nasal spray pt instructed to use on day of surgery     Pt denies fever, sob, sore throat and cough  Pt verbalized understanding of shower and med instructions    Pt instructed to stop nsaids and supplements prior to surgery

## 2021-09-07 ENCOUNTER — ANESTHESIA EVENT (OUTPATIENT)
Dept: PERIOP | Facility: HOSPITAL | Age: 31
End: 2021-09-07
Payer: COMMERCIAL

## 2021-09-07 NOTE — ANESTHESIA PREPROCEDURE EVALUATION
Procedure:  BIOPSY MUSCLE, right quadriceps (Right Leg Upper)    Relevant Problems   GI/HEPATIC   (+) Oropharyngeal dysphagia      MUSCULOSKELETAL   (+) Dermatomyositis (HCC) (Dermatopolymyositis)        Physical Exam    Airway    Mallampati score: II  TM Distance: >3 FB  Neck ROM: full     Dental   No notable dental hx     Cardiovascular  Cardiovascular exam normal    Pulmonary  Pulmonary exam normal     Other Findings        Anesthesia Plan  ASA Score- 2     Anesthesia Type- spinal with ASA Monitors  Additional Monitors:   Airway Plan:           Plan Factors-Exercise tolerance (METS): >4 METS  Chart reviewed  EKG reviewed  Imaging results reviewed  Existing labs reviewed  Patient is not a current smoker  Patient instructed to abstain from smoking on day of procedure  Patient did not smoke on day of surgery  Induction-     Postoperative Plan- Plan for postoperative opioid use  Informed Consent- Anesthetic plan and risks discussed with patient  I personally reviewed this patient with the CRNA  Discussed and agreed on the Anesthesia Plan with the CRNA  Mahamed Davis

## 2021-09-08 ENCOUNTER — HOSPITAL ENCOUNTER (OUTPATIENT)
Facility: HOSPITAL | Age: 31
Setting detail: OUTPATIENT SURGERY
Discharge: HOME | End: 2021-09-08
Attending: SURGERY | Admitting: SURGERY
Payer: COMMERCIAL

## 2021-09-08 ENCOUNTER — ANESTHESIA (OUTPATIENT)
Dept: PERIOP | Facility: HOSPITAL | Age: 31
End: 2021-09-08
Payer: COMMERCIAL

## 2021-09-08 VITALS
HEART RATE: 84 BPM | TEMPERATURE: 96.5 F | HEIGHT: 65 IN | RESPIRATION RATE: 18 BRPM | SYSTOLIC BLOOD PRESSURE: 97 MMHG | DIASTOLIC BLOOD PRESSURE: 60 MMHG | WEIGHT: 130 LBS | OXYGEN SATURATION: 98 % | BODY MASS INDEX: 21.66 KG/M2

## 2021-09-08 DIAGNOSIS — M33.90 DERMATOMYOSITIS (HCC): Primary | ICD-10-CM

## 2021-09-08 DIAGNOSIS — M79.10 MYALGIA: ICD-10-CM

## 2021-09-08 LAB
EXT PREGNANCY TEST URINE: NEGATIVE
EXT. CONTROL: NORMAL

## 2021-09-08 PROCEDURE — 20205 DEEP MUSCLE BIOPSY: CPT | Performed by: SURGERY

## 2021-09-08 PROCEDURE — 88300 SURGICAL PATH GROSS: CPT | Performed by: PATHOLOGY

## 2021-09-08 PROCEDURE — 81025 URINE PREGNANCY TEST: CPT | Performed by: SURGERY

## 2021-09-08 RX ORDER — HYDROMORPHONE HCL/PF 1 MG/ML
0.5 SYRINGE (ML) INJECTION
Status: DISCONTINUED | OUTPATIENT
Start: 2021-09-08 | End: 2021-09-08 | Stop reason: HOSPADM

## 2021-09-08 RX ORDER — FENTANYL CITRATE 50 UG/ML
INJECTION, SOLUTION INTRAMUSCULAR; INTRAVENOUS AS NEEDED
Status: DISCONTINUED | OUTPATIENT
Start: 2021-09-08 | End: 2021-09-08

## 2021-09-08 RX ORDER — PROMETHAZINE HYDROCHLORIDE 25 MG/ML
12.5 INJECTION, SOLUTION INTRAMUSCULAR; INTRAVENOUS ONCE AS NEEDED
Status: DISCONTINUED | OUTPATIENT
Start: 2021-09-08 | End: 2021-09-08 | Stop reason: HOSPADM

## 2021-09-08 RX ORDER — PROPOFOL 10 MG/ML
INJECTION, EMULSION INTRAVENOUS CONTINUOUS PRN
Status: DISCONTINUED | OUTPATIENT
Start: 2021-09-08 | End: 2021-09-08

## 2021-09-08 RX ORDER — HYDROCODONE BITARTRATE AND ACETAMINOPHEN 5; 325 MG/1; MG/1
1 TABLET ORAL EVERY 6 HOURS PRN
Status: DISCONTINUED | OUTPATIENT
Start: 2021-09-08 | End: 2021-09-08 | Stop reason: HOSPADM

## 2021-09-08 RX ORDER — HYDROMORPHONE HCL IN WATER/PF 6 MG/30 ML
0.2 PATIENT CONTROLLED ANALGESIA SYRINGE INTRAVENOUS
Status: DISCONTINUED | OUTPATIENT
Start: 2021-09-08 | End: 2021-09-08 | Stop reason: HOSPADM

## 2021-09-08 RX ORDER — EPHEDRINE SULFATE 50 MG/ML
INJECTION INTRAVENOUS AS NEEDED
Status: DISCONTINUED | OUTPATIENT
Start: 2021-09-08 | End: 2021-09-08

## 2021-09-08 RX ORDER — BUPIVACAINE HYDROCHLORIDE AND EPINEPHRINE 2.5; 5 MG/ML; UG/ML
INJECTION, SOLUTION INFILTRATION; PERINEURAL AS NEEDED
Status: DISCONTINUED | OUTPATIENT
Start: 2021-09-08 | End: 2021-09-08 | Stop reason: HOSPADM

## 2021-09-08 RX ORDER — BUPIVACAINE HYDROCHLORIDE 7.5 MG/ML
INJECTION, SOLUTION INTRASPINAL AS NEEDED
Status: DISCONTINUED | OUTPATIENT
Start: 2021-09-08 | End: 2021-09-08

## 2021-09-08 RX ORDER — ACETAMINOPHEN 325 MG/1
650 TABLET ORAL EVERY 4 HOURS PRN
Status: DISCONTINUED | OUTPATIENT
Start: 2021-09-08 | End: 2021-09-08 | Stop reason: HOSPADM

## 2021-09-08 RX ORDER — ONDANSETRON 2 MG/ML
INJECTION INTRAMUSCULAR; INTRAVENOUS AS NEEDED
Status: DISCONTINUED | OUTPATIENT
Start: 2021-09-08 | End: 2021-09-08

## 2021-09-08 RX ORDER — DEXAMETHASONE SODIUM PHOSPHATE 10 MG/ML
INJECTION, SOLUTION INTRAMUSCULAR; INTRAVENOUS AS NEEDED
Status: DISCONTINUED | OUTPATIENT
Start: 2021-09-08 | End: 2021-09-08

## 2021-09-08 RX ORDER — LIDOCAINE HYDROCHLORIDE 10 MG/ML
INJECTION, SOLUTION EPIDURAL; INFILTRATION; INTRACAUDAL; PERINEURAL AS NEEDED
Status: DISCONTINUED | OUTPATIENT
Start: 2021-09-08 | End: 2021-09-08

## 2021-09-08 RX ORDER — MIDAZOLAM HYDROCHLORIDE 2 MG/2ML
INJECTION, SOLUTION INTRAMUSCULAR; INTRAVENOUS AS NEEDED
Status: DISCONTINUED | OUTPATIENT
Start: 2021-09-08 | End: 2021-09-08

## 2021-09-08 RX ORDER — ONDANSETRON 2 MG/ML
4 INJECTION INTRAMUSCULAR; INTRAVENOUS EVERY 4 HOURS PRN
Status: DISCONTINUED | OUTPATIENT
Start: 2021-09-08 | End: 2021-09-08 | Stop reason: HOSPADM

## 2021-09-08 RX ORDER — SODIUM CHLORIDE, SODIUM LACTATE, POTASSIUM CHLORIDE, CALCIUM CHLORIDE 600; 310; 30; 20 MG/100ML; MG/100ML; MG/100ML; MG/100ML
100 INJECTION, SOLUTION INTRAVENOUS CONTINUOUS
Status: DISCONTINUED | OUTPATIENT
Start: 2021-09-08 | End: 2021-09-08 | Stop reason: HOSPADM

## 2021-09-08 RX ORDER — ONDANSETRON 2 MG/ML
4 INJECTION INTRAMUSCULAR; INTRAVENOUS ONCE AS NEEDED
Status: DISCONTINUED | OUTPATIENT
Start: 2021-09-08 | End: 2021-09-08 | Stop reason: HOSPADM

## 2021-09-08 RX ORDER — CEFAZOLIN SODIUM 1 G/50ML
1000 SOLUTION INTRAVENOUS ONCE
Status: COMPLETED | OUTPATIENT
Start: 2021-09-08 | End: 2021-09-08

## 2021-09-08 RX ORDER — HYDROCODONE BITARTRATE AND ACETAMINOPHEN 5; 325 MG/1; MG/1
1 TABLET ORAL EVERY 6 HOURS PRN
Qty: 10 TABLET | Refills: 0 | Status: SHIPPED | OUTPATIENT
Start: 2021-09-08 | End: 2021-10-08 | Stop reason: HOSPADM

## 2021-09-08 RX ORDER — FENTANYL CITRATE/PF 50 MCG/ML
25 SYRINGE (ML) INJECTION
Status: DISCONTINUED | OUTPATIENT
Start: 2021-09-08 | End: 2021-09-08 | Stop reason: HOSPADM

## 2021-09-08 RX ORDER — FOLIC ACID 1 MG/1
1 TABLET ORAL DAILY
COMMUNITY

## 2021-09-08 RX ORDER — SODIUM CHLORIDE, SODIUM LACTATE, POTASSIUM CHLORIDE, CALCIUM CHLORIDE 600; 310; 30; 20 MG/100ML; MG/100ML; MG/100ML; MG/100ML
INJECTION, SOLUTION INTRAVENOUS CONTINUOUS PRN
Status: DISCONTINUED | OUTPATIENT
Start: 2021-09-08 | End: 2021-09-08

## 2021-09-08 RX ORDER — MAGNESIUM HYDROXIDE 1200 MG/15ML
LIQUID ORAL AS NEEDED
Status: DISCONTINUED | OUTPATIENT
Start: 2021-09-08 | End: 2021-09-08 | Stop reason: HOSPADM

## 2021-09-08 RX ADMIN — MIDAZOLAM 2 MG: 1 INJECTION INTRAMUSCULAR; INTRAVENOUS at 09:52

## 2021-09-08 RX ADMIN — BUPIVACAINE HYDROCHLORIDE IN DEXTROSE 1 ML: 7.5 INJECTION, SOLUTION SUBARACHNOID at 10:02

## 2021-09-08 RX ADMIN — EPHEDRINE SULFATE 10 MG: 50 INJECTION, SOLUTION INTRAVENOUS at 10:22

## 2021-09-08 RX ADMIN — CEFAZOLIN SODIUM 1000 MG: 1 SOLUTION INTRAVENOUS at 09:56

## 2021-09-08 RX ADMIN — DEXAMETHASONE SODIUM PHOSPHATE 8 MG: 10 INJECTION, SOLUTION INTRAMUSCULAR; INTRAVENOUS at 10:32

## 2021-09-08 RX ADMIN — SODIUM CHLORIDE, SODIUM LACTATE, POTASSIUM CHLORIDE, AND CALCIUM CHLORIDE: .6; .31; .03; .02 INJECTION, SOLUTION INTRAVENOUS at 10:50

## 2021-09-08 RX ADMIN — PROPOFOL 80 MCG/KG/MIN: 10 INJECTION, EMULSION INTRAVENOUS at 10:06

## 2021-09-08 RX ADMIN — ONDANSETRON 4 MG: 2 INJECTION INTRAMUSCULAR; INTRAVENOUS at 10:14

## 2021-09-08 RX ADMIN — SODIUM CHLORIDE, SODIUM LACTATE, POTASSIUM CHLORIDE, AND CALCIUM CHLORIDE: .6; .31; .03; .02 INJECTION, SOLUTION INTRAVENOUS at 09:42

## 2021-09-08 RX ADMIN — LIDOCAINE HYDROCHLORIDE 30 MG: 10 INJECTION, SOLUTION EPIDURAL; INFILTRATION; INTRACAUDAL; PERINEURAL at 10:05

## 2021-09-08 RX ADMIN — FENTANYL CITRATE 15 MCG: 50 INJECTION INTRAMUSCULAR; INTRAVENOUS at 10:02

## 2021-09-08 NOTE — OP NOTE
OPERATIVE REPORT  PATIENT NAME: Clara Vergara    :  1990  MRN: [id number]  Pt Location: [facility] OR ROOM 07    SURGERY DATE: 2021    Surgeon(s) and Role:     Kylah Gotti MD - Primary    Preop Diagnosis:  Myalgia [M79 10]    Post-Op Diagnosis Codes:     * Myalgia [M79 10]    Procedure(s) (LRB):  BIOPSY MUSCLE, right quadriceps (Right)    Specimen(s):  ID Type Source Tests Collected by Time Destination   1 : right quadricep muscle biopsy  Tissue Muscle TISSUE EXAM Debby Lundberg MD 2021 10:23 AM        Estimated Blood Loss:   Minimal    Drains:  * No LDAs found *    Anesthesia Type:   General    Operative Indications:  Myalgia [M79 10]    Independent, nonsmoker, ASA 2, wound class 1, BMI 22, weight 130, height 65  Dermatomyositis / myopathy    Complications:   None    Procedure and Technique:  Patient was identified visually and via armband  Placed in supine position  Sterile prep and drape was completed over the right anterior thigh  Time-out was then completed  Antibiotics up-to-date  A thrombotic pumps in place  Incision in the upper anterior thigh midway between the rectus femoris and the vastus lateralis was created  This carried down through skin subcutaneous tissue  Cutaneous nerve was identified and preserved during take case  Fascia was divided  A representative muscle biopsy was then completed  Fascia was closed with 0 Vicryl suture  This followed by 3 0 Vicryl subcutaneous and 4 Monocryl sutures  The muscle biopsy was then hand carried to the pathology lab and and off was completed  They are aware that it is a fresh specimen that is put on ice and sent to [place]  They are that it is the FX pathology case  Patient tolerated this procedure well  Sponge instrument count correct x2     I was present for the entire procedure    Patient Disposition:  PACU     SIGNATURE: Debby Lundberg MD  DATE: 2021  TIME: 11:02 AM

## 2021-09-08 NOTE — ANESTHESIA POSTPROCEDURE EVALUATION
Post-Op Assessment Note    CV Status:  Stable  Pain Score: 0    Pain management: adequate     Mental Status:  Alert and awake   Hydration Status:  Euvolemic   PONV Controlled:  Controlled   Airway Patency:  Patent      Post Op Vitals Reviewed: Yes      Staff: Anesthesiologist         No complications documented      BP (!) (P) 101/45 (09/08/21 1059)    Temp (!) (P) 96 6 °F (35 9 °C) (09/08/21 1059)    Pulse (P) 83 (09/08/21 1059)   Resp (P) 16 (09/08/21 1059)    SpO2

## 2021-09-08 NOTE — INTERVAL H&P NOTE
H&P reviewed  After examining the patient I find no changes in the patients condition since the H&P had been written    Ext nl  Vitals:    09/08/21 0800   BP: 110/78   Pulse: 81   Resp: 16   Temp: 98 °F (36 7 °C)   SpO2: 99%

## 2021-09-08 NOTE — PERIOPERATIVE NURSING NOTE
Dr Cecil Cazares made aware of pt's voiding and walking status  Also made aware that pt has some numbness around her vagina and bottom when she wipes  He is ok for pt to d/c to home

## 2021-09-08 NOTE — ANESTHESIA PROCEDURE NOTES
Spinal Block    Patient location during procedure: OR  Start time: 9/8/2021 10:02 AM  Reason for block: procedure for pain, at surgeon's request and primary anesthetic  Staffing  Performed: Anesthesiologist   Anesthesiologist: Kari Browne MD  Preanesthetic Checklist  Completed: patient identified, IV checked, site marked, risks and benefits discussed, surgical consent, monitors and equipment checked, pre-op evaluation and timeout performed  Spinal Block  Patient position: sitting  Prep: ChloraPrep  Patient monitoring: cardiac monitor and frequent blood pressure checks  Approach: midline  Location: L4-5  Injection technique: single-shot  Needle  Needle type: pencil-tip   Needle gauge: 25 G  Needle length: 10 cm  Assessment  Sensory level: T9   Injection Assessment:  negative aspiration for heme, no paresthesia on injection and positive aspiration for clear CSF    Post-procedure:  adhesive bandage applied, pressure dressing applied, secured with tape, site cleaned and sterile dressing applied

## 2021-09-09 ENCOUNTER — APPOINTMENT (OUTPATIENT)
Dept: LAB | Facility: HOSPITAL | Age: 31
End: 2021-09-09
Attending: PSYCHIATRY & NEUROLOGY
Payer: COMMERCIAL

## 2021-09-09 ENCOUNTER — TELEPHONE (OUTPATIENT)
Dept: NEUROLOGY | Facility: CLINIC | Age: 31
End: 2021-09-09

## 2021-09-09 DIAGNOSIS — M33.90 DERMATOMYOSITIS (HCC): ICD-10-CM

## 2021-09-09 LAB
CK MB SERPL-MCNC: 1.3 % (ref 0–2.5)
CK MB SERPL-MCNC: 5.1 NG/ML (ref 0–5)
CK SERPL-CCNC: 399 U/L (ref 26–192)
ERYTHROCYTE [SEDIMENTATION RATE] IN BLOOD: 1 MM/HOUR (ref 0–19)

## 2021-09-09 PROCEDURE — 85652 RBC SED RATE AUTOMATED: CPT

## 2021-09-09 PROCEDURE — 82085 ASSAY OF ALDOLASE: CPT

## 2021-09-09 PROCEDURE — 82550 ASSAY OF CK (CPK): CPT

## 2021-09-09 PROCEDURE — 82553 CREATINE MB FRACTION: CPT

## 2021-09-09 PROCEDURE — 36415 COLL VENOUS BLD VENIPUNCTURE: CPT

## 2021-09-09 NOTE — TELEPHONE ENCOUNTER
Received a call from Dr Cheryl Cuadra, pathology at  [facility] laboratory,  Patient had muscle biopsy yesterday, it is consistent with dermatomyositis,  As was the clinical suspicion  He will send formal report shortly  Tried calling the patient, she was not available, left a message on her machine  We already had planned to start prednisone after the biopsy,  She will start with 20 mg daily

## 2021-09-10 LAB — ALDOLASE SERPL-CCNC: 6.3 U/L (ref 3.3–10.3)

## 2021-09-11 NOTE — ADDENDUM NOTE
Addendum  created 09/11/21 1836 by Genaro Ingram MD    Clinical Note Signed, Intraprocedure Event edited

## 2021-09-21 ENCOUNTER — OFFICE VISIT (OUTPATIENT)
Dept: SURGERY | Facility: CLINIC | Age: 31
End: 2021-09-21

## 2021-09-21 DIAGNOSIS — Z48.89 POSTOPERATIVE VISIT: Primary | ICD-10-CM

## 2021-09-21 PROCEDURE — 99024 POSTOP FOLLOW-UP VISIT: CPT | Performed by: SURGERY

## 2021-09-22 NOTE — PROGRESS NOTES
He video chat was obtained for postoperative visit  Patient feels well  She has no complaints  Incisions clean healing nicely  Pathology was reviewed and is consistent with dermatomyositis  Activity instructions provided  All questions answered

## 2021-09-30 ENCOUNTER — ANESTHESIA EVENT (OUTPATIENT)
Dept: PERIOP | Facility: HOSPITAL | Age: 31
End: 2021-09-30
Payer: COMMERCIAL

## 2021-10-04 RX ORDER — PREDNISONE 20 MG/1
20 TABLET ORAL DAILY
COMMUNITY
End: 2021-11-04 | Stop reason: SDUPTHER

## 2021-10-07 PROBLEM — Z98.890 PONV (POSTOPERATIVE NAUSEA AND VOMITING): Status: ACTIVE | Noted: 2021-10-07

## 2021-10-07 PROBLEM — R11.2 PONV (POSTOPERATIVE NAUSEA AND VOMITING): Status: ACTIVE | Noted: 2021-10-07

## 2021-10-07 RX ORDER — LIDOCAINE HYDROCHLORIDE 10 MG/ML
10 INJECTION, SOLUTION EPIDURAL; INFILTRATION; INTRACAUDAL; PERINEURAL ONCE
Status: CANCELLED | OUTPATIENT
Start: 2021-10-08

## 2021-10-07 RX ORDER — BUPIVACAINE HYDROCHLORIDE 2.5 MG/ML
10 INJECTION, SOLUTION EPIDURAL; INFILTRATION; INTRACAUDAL ONCE
Status: CANCELLED | OUTPATIENT
Start: 2021-10-08

## 2021-10-08 ENCOUNTER — HOSPITAL ENCOUNTER (OUTPATIENT)
Facility: HOSPITAL | Age: 31
Setting detail: OUTPATIENT SURGERY
Discharge: HOME | End: 2021-10-08
Attending: SURGERY | Admitting: SURGERY
Payer: COMMERCIAL

## 2021-10-08 ENCOUNTER — ANESTHESIA (OUTPATIENT)
Dept: PERIOP | Facility: HOSPITAL | Age: 31
End: 2021-10-08
Payer: COMMERCIAL

## 2021-10-08 VITALS
SYSTOLIC BLOOD PRESSURE: 133 MMHG | TEMPERATURE: 97.8 F | HEIGHT: 65 IN | HEART RATE: 91 BPM | DIASTOLIC BLOOD PRESSURE: 81 MMHG | WEIGHT: 130 LBS | OXYGEN SATURATION: 100 % | BODY MASS INDEX: 21.66 KG/M2 | RESPIRATION RATE: 18 BRPM

## 2021-10-08 DIAGNOSIS — G56.23 CUBITAL TUNNEL SYNDROME, BILATERAL: ICD-10-CM

## 2021-10-08 DIAGNOSIS — G56.01 CARPAL TUNNEL SYNDROME ON RIGHT: Primary | ICD-10-CM

## 2021-10-08 LAB
EXT PREGNANCY TEST URINE: NEGATIVE
EXT. CONTROL: NORMAL

## 2021-10-08 PROCEDURE — 15736 MUSCLE-SKIN GRAFT ARM: CPT | Performed by: SURGERY

## 2021-10-08 PROCEDURE — 81025 URINE PREGNANCY TEST: CPT | Performed by: SURGERY

## 2021-10-08 PROCEDURE — 64721 CARPAL TUNNEL SURGERY: CPT | Performed by: SURGERY

## 2021-10-08 PROCEDURE — NC001 PR NO CHARGE: Performed by: SURGERY

## 2021-10-08 PROCEDURE — 64718 REVISE ULNAR NERVE AT ELBOW: CPT | Performed by: SURGERY

## 2021-10-08 RX ORDER — MIDAZOLAM HYDROCHLORIDE 2 MG/2ML
2 INJECTION, SOLUTION INTRAMUSCULAR; INTRAVENOUS ONCE
Status: DISCONTINUED | OUTPATIENT
Start: 2021-10-08 | End: 2021-10-08 | Stop reason: HOSPADM

## 2021-10-08 RX ORDER — CEFAZOLIN SODIUM 1 G/3ML
INJECTION, POWDER, FOR SOLUTION INTRAMUSCULAR; INTRAVENOUS AS NEEDED
Status: DISCONTINUED | OUTPATIENT
Start: 2021-10-08 | End: 2021-10-08

## 2021-10-08 RX ORDER — FENTANYL CITRATE 50 UG/ML
INJECTION, SOLUTION INTRAMUSCULAR; INTRAVENOUS AS NEEDED
Status: DISCONTINUED | OUTPATIENT
Start: 2021-10-08 | End: 2021-10-08

## 2021-10-08 RX ORDER — FENTANYL CITRATE/PF 50 MCG/ML
50 SYRINGE (ML) INJECTION
Status: DISCONTINUED | OUTPATIENT
Start: 2021-10-08 | End: 2021-10-08 | Stop reason: HOSPADM

## 2021-10-08 RX ORDER — MIDAZOLAM HYDROCHLORIDE 2 MG/2ML
INJECTION, SOLUTION INTRAMUSCULAR; INTRAVENOUS AS NEEDED
Status: DISCONTINUED | OUTPATIENT
Start: 2021-10-08 | End: 2021-10-08

## 2021-10-08 RX ORDER — SODIUM CHLORIDE, SODIUM LACTATE, POTASSIUM CHLORIDE, CALCIUM CHLORIDE 600; 310; 30; 20 MG/100ML; MG/100ML; MG/100ML; MG/100ML
75 INJECTION, SOLUTION INTRAVENOUS CONTINUOUS
Status: DISCONTINUED | OUTPATIENT
Start: 2021-10-08 | End: 2021-10-08 | Stop reason: HOSPADM

## 2021-10-08 RX ORDER — BUPIVACAINE HYDROCHLORIDE 5 MG/ML
INJECTION, SOLUTION PERINEURAL
Status: DISCONTINUED | OUTPATIENT
Start: 2021-10-08 | End: 2021-10-08

## 2021-10-08 RX ORDER — LIDOCAINE HYDROCHLORIDE 10 MG/ML
INJECTION, SOLUTION EPIDURAL; INFILTRATION; INTRACAUDAL; PERINEURAL AS NEEDED
Status: DISCONTINUED | OUTPATIENT
Start: 2021-10-08 | End: 2021-10-08

## 2021-10-08 RX ORDER — ONDANSETRON 2 MG/ML
4 INJECTION INTRAMUSCULAR; INTRAVENOUS ONCE AS NEEDED
Status: DISCONTINUED | OUTPATIENT
Start: 2021-10-08 | End: 2021-10-08 | Stop reason: HOSPADM

## 2021-10-08 RX ORDER — BUPIVACAINE HYDROCHLORIDE 5 MG/ML
INJECTION, SOLUTION EPIDURAL; INTRACAUDAL AS NEEDED
Status: DISCONTINUED | OUTPATIENT
Start: 2021-10-08 | End: 2021-10-08

## 2021-10-08 RX ORDER — MAGNESIUM HYDROXIDE 1200 MG/15ML
LIQUID ORAL AS NEEDED
Status: DISCONTINUED | OUTPATIENT
Start: 2021-10-08 | End: 2021-10-08 | Stop reason: HOSPADM

## 2021-10-08 RX ORDER — PROPOFOL 10 MG/ML
INJECTION, EMULSION INTRAVENOUS CONTINUOUS PRN
Status: DISCONTINUED | OUTPATIENT
Start: 2021-10-08 | End: 2021-10-08

## 2021-10-08 RX ORDER — SODIUM CHLORIDE, SODIUM LACTATE, POTASSIUM CHLORIDE, CALCIUM CHLORIDE 600; 310; 30; 20 MG/100ML; MG/100ML; MG/100ML; MG/100ML
INJECTION, SOLUTION INTRAVENOUS CONTINUOUS PRN
Status: DISCONTINUED | OUTPATIENT
Start: 2021-10-08 | End: 2021-10-08

## 2021-10-08 RX ORDER — LIDOCAINE HYDROCHLORIDE 10 MG/ML
0.5 INJECTION, SOLUTION EPIDURAL; INFILTRATION; INTRACAUDAL; PERINEURAL ONCE AS NEEDED
Status: COMPLETED | OUTPATIENT
Start: 2021-10-08 | End: 2021-10-08

## 2021-10-08 RX ORDER — CEFAZOLIN SODIUM 2 G/50ML
2000 SOLUTION INTRAVENOUS ONCE
Status: DISCONTINUED | OUTPATIENT
Start: 2021-10-08 | End: 2021-10-08 | Stop reason: HOSPADM

## 2021-10-08 RX ORDER — SODIUM CHLORIDE, SODIUM LACTATE, POTASSIUM CHLORIDE, CALCIUM CHLORIDE 600; 310; 30; 20 MG/100ML; MG/100ML; MG/100ML; MG/100ML
125 INJECTION, SOLUTION INTRAVENOUS CONTINUOUS
Status: DISCONTINUED | OUTPATIENT
Start: 2021-10-08 | End: 2021-10-08 | Stop reason: HOSPADM

## 2021-10-08 RX ORDER — HYDROCODONE BITARTRATE AND ACETAMINOPHEN 5; 325 MG/1; MG/1
1 TABLET ORAL EVERY 6 HOURS PRN
Qty: 16 TABLET | Refills: 0 | Status: SHIPPED | OUTPATIENT
Start: 2021-10-08 | End: 2021-10-12

## 2021-10-08 RX ORDER — PROPOFOL 10 MG/ML
INJECTION, EMULSION INTRAVENOUS AS NEEDED
Status: DISCONTINUED | OUTPATIENT
Start: 2021-10-08 | End: 2021-10-08

## 2021-10-08 RX ORDER — LIDOCAINE HYDROCHLORIDE AND EPINEPHRINE 20; 5 MG/ML; UG/ML
INJECTION, SOLUTION EPIDURAL; INFILTRATION; INTRACAUDAL; PERINEURAL AS NEEDED
Status: DISCONTINUED | OUTPATIENT
Start: 2021-10-08 | End: 2021-10-08

## 2021-10-08 RX ADMIN — CEFAZOLIN 1000 MG: 1 INJECTION, POWDER, FOR SOLUTION INTRAMUSCULAR; INTRAVENOUS at 10:05

## 2021-10-08 RX ADMIN — SODIUM CHLORIDE, SODIUM LACTATE, POTASSIUM CHLORIDE, AND CALCIUM CHLORIDE: .6; .31; .03; .02 INJECTION, SOLUTION INTRAVENOUS at 10:04

## 2021-10-08 RX ADMIN — MIDAZOLAM 2 MG: 1 INJECTION INTRAMUSCULAR; INTRAVENOUS at 09:20

## 2021-10-08 RX ADMIN — FENTANYL CITRATE 50 MCG: 50 INJECTION INTRAMUSCULAR; INTRAVENOUS at 09:20

## 2021-10-08 RX ADMIN — LIDOCAINE HYDROCHLORIDE AND EPINEPHRINE 10 ML: 20; 5 INJECTION, SOLUTION EPIDURAL; INFILTRATION; INTRACAUDAL; PERINEURAL at 09:20

## 2021-10-08 RX ADMIN — PROPOFOL 130 MCG/KG/MIN: 10 INJECTION, EMULSION INTRAVENOUS at 10:10

## 2021-10-08 RX ADMIN — LIDOCAINE HYDROCHLORIDE 5 ML: 10 INJECTION, SOLUTION EPIDURAL; INFILTRATION; INTRACAUDAL; PERINEURAL at 10:09

## 2021-10-08 RX ADMIN — LIDOCAINE HYDROCHLORIDE 0.5 ML: 10 INJECTION, SOLUTION EPIDURAL; INFILTRATION; INTRACAUDAL; PERINEURAL at 08:34

## 2021-10-08 RX ADMIN — LIDOCAINE HYDROCHLORIDE 40 MG: 10 INJECTION, SOLUTION EPIDURAL; INFILTRATION; INTRACAUDAL; PERINEURAL at 10:08

## 2021-10-08 RX ADMIN — BUPIVACAINE HYDROCHLORIDE 15 ML: 5 INJECTION, SOLUTION EPIDURAL; INTRACAUDAL at 09:20

## 2021-10-08 RX ADMIN — PROPOFOL 30 MG: 10 INJECTION, EMULSION INTRAVENOUS at 10:10

## 2021-10-08 RX ADMIN — SODIUM CHLORIDE, SODIUM LACTATE, POTASSIUM CHLORIDE, AND CALCIUM CHLORIDE 125 ML/HR: .6; .31; .03; .02 INJECTION, SOLUTION INTRAVENOUS at 08:34

## 2021-10-08 RX ADMIN — PROPOFOL 50 MG: 10 INJECTION, EMULSION INTRAVENOUS at 10:08

## 2021-10-20 ENCOUNTER — OFFICE VISIT (OUTPATIENT)
Dept: OBGYN CLINIC | Facility: CLINIC | Age: 31
End: 2021-10-20
Payer: COMMERCIAL

## 2021-10-20 VITALS
BODY MASS INDEX: 21.66 KG/M2 | SYSTOLIC BLOOD PRESSURE: 105 MMHG | WEIGHT: 130 LBS | DIASTOLIC BLOOD PRESSURE: 68 MMHG | HEART RATE: 73 BPM | RESPIRATION RATE: 17 BRPM | HEIGHT: 65 IN

## 2021-10-20 DIAGNOSIS — Z98.890 S/P CUBITAL TUNNEL RELEASE: ICD-10-CM

## 2021-10-20 DIAGNOSIS — Z98.890 S/P CARPAL TUNNEL RELEASE: ICD-10-CM

## 2021-10-20 DIAGNOSIS — G56.02 CARPAL TUNNEL SYNDROME ON LEFT: ICD-10-CM

## 2021-10-20 DIAGNOSIS — G56.22 CUBITAL TUNNEL SYNDROME ON LEFT: Primary | ICD-10-CM

## 2021-10-20 PROCEDURE — 99024 POSTOP FOLLOW-UP VISIT: CPT | Performed by: SURGERY

## 2021-10-20 PROCEDURE — 3008F BODY MASS INDEX DOCD: CPT | Performed by: SURGERY

## 2021-10-20 PROCEDURE — 1036F TOBACCO NON-USER: CPT | Performed by: SURGERY

## 2021-10-20 PROCEDURE — 99212 OFFICE O/P EST SF 10 MIN: CPT | Performed by: SURGERY

## 2021-11-04 DIAGNOSIS — M33.90 DERMATOMYOSITIS (HCC): Primary | ICD-10-CM

## 2021-11-05 RX ORDER — PREDNISONE 20 MG/1
20 TABLET ORAL DAILY
Qty: 30 TABLET | Refills: 0 | Status: SHIPPED | OUTPATIENT
Start: 2021-11-05 | End: 2021-12-08 | Stop reason: SDUPTHER

## 2021-11-09 ENCOUNTER — HOSPITAL ENCOUNTER (OUTPATIENT)
Dept: RADIOLOGY | Facility: HOSPITAL | Age: 31
Discharge: HOME | End: 2021-11-09
Payer: COMMERCIAL

## 2021-11-09 DIAGNOSIS — G56.22 CUBITAL TUNNEL SYNDROME ON LEFT: ICD-10-CM

## 2021-11-09 DIAGNOSIS — G56.02 CARPAL TUNNEL SYNDROME ON LEFT: ICD-10-CM

## 2021-11-09 PROCEDURE — 76882 US LMTD JT/FCL EVL NVASC XTR: CPT

## 2021-12-08 DIAGNOSIS — M33.90 DERMATOMYOSITIS (HCC): ICD-10-CM

## 2021-12-08 RX ORDER — PREDNISONE 20 MG/1
20 TABLET ORAL DAILY
Qty: 30 TABLET | Refills: 0 | Status: SHIPPED | OUTPATIENT
Start: 2021-12-08 | End: 2022-01-08 | Stop reason: SDUPTHER

## 2022-01-12 ENCOUNTER — ANESTHESIA EVENT (OUTPATIENT)
Dept: PERIOP | Facility: HOSPITAL | Age: 32
End: 2022-01-12
Payer: COMMERCIAL

## 2022-01-17 NOTE — PRE-PROCEDURE INSTRUCTIONS
Pre-Surgery Instructions:   Medication Instructions    BIOTIN PO Instructed to avoid all ASA and OTC Vit/Supp 1 week prior to surgery and to avoid NSAIDs 3 days prior to surgery per anesthesia instructions  Tylenol ok to take prn   Calcium Carb-Cholecalciferol (OSCAL-D) 500 mg-200 units per tablet Instructed to avoid all ASA and OTC Vit/Supp 1 week prior to surgery and to avoid NSAIDs 3 days prior to surgery per anesthesia instructions  Tylenol ok to take prn   folic acid (FOLVITE) 1 mg tablet Instructed to avoid all ASA and OTC Vit/Supp 1 week prior to surgery and to avoid NSAIDs 3 days prior to surgery per anesthesia instructions  Tylenol ok to take prn   predniSONE 20 mg tablet Instructed to take per normal schedule including DOS with sips water     Have you had / have a sore throat? no  Have you had / have a cough less than 1 week? no  Have you had / have a fever greater than 100 0 - 100  4? no  Are you experiencing any shortness of breath? No  Not vaccinated    Reviewed with patient, in detail, instructions from "My Surgical Experience"  Instructed to avoid all ASA and OTC Vit/Supp 1 week prior to surgery and to avoid NSAIDs 7 days prior to surgery per anesthesia instructions  Tylenol ok to take prn  Advised patient that Migue Chery will call with surgery arrival time and hospital directions the business day prior to surgery  Advised patient nothing eat or drink after midnight  Patient verbalized understanding and knows to call surgeon's office with any additional questions prior to surgery  Patient verbalized understanding of current visitor restrictions and advised that he/she can confirm these at time of arrival call with Migue Chery  My Surgical Experience    The following information was developed to assist you to prepare for your operation      What do I need to do before coming to the hospital?   Arrange for a responsible person to drive you to and from the hospital    Arrange care for your children at home  Children are not allowed in the recovery areas of the hospital   Plan to wear clothing that is easy to put on and take off  If you are having shoulder surgery, wear a shirt that buttons or zippers in the front  Bathing  o Shower the evening before and the morning of your surgery with an antibacterial soap  Please refer to the Pre Op Showering Instructions for Surgery Patients Sheet   o Remove nail polish and all body piercing jewelry  o Do not shave any body part for at least 24 hours before surgery-this includes face, arms, legs and upper body  Food  o Nothing to eat or drink after midnight the night before your surgery  This includes candy and chewing gum  o Exception: If your surgery is after 12:00pm (noon), you may have clear liquids such as 7-Up®, ginger ale, apple or cranberry juice, Jell-O®, water, or clear broth until 8:00 am  o Do not drink milk or juice with pulp on the morning before surgery  o Do not drink alcohol 24 hours before surgery  Medicine  o Follow instructions you received from your surgeon about which medicines you may take on the day of surgery  o If instructed to take medicine on the morning of surgery, take pills with just a small sip of water  Call your prescribing doctor for specific information on what to do if you take insulin    What should I bring to the hospital?    Bring:  Maxine Potters or a walker, if you have them, for foot or knee surgery   A list of the daily medicines, vitamins, minerals, herbals and nutritional supplements you take   Include the dosages of medicines and the time you take them each day   Glasses, dentures or hearing aids   Minimal clothing; you will be wearing hospital sleepwear   Photo ID; required to verify your identity   If you have a Living Will or Power of , bring a copy of the documents   If you have an ostomy, bring an extra pouch and any supplies you use    Do not bring   Medicines or inhalers   Money, valuables or jewelry    What other information should I know about the day of surgery?  Notify your surgeons if you develop a cold, sore throat, cough, fever, rash or any other illness   Report to the Ambulatory Surgical/Same Day Surgery Unit   You will be instructed to stop at Registration only if you have not been pre-registered   Inform your  fi they do not stay that they will be asked by the staff to leave a phone number where they can be reached   Be available to be reached before surgery  In the event the operating room schedule changes, you may be asked to come in earlier or later than expected    *It is important to tell your doctor and others involved in your health care if you are taking or have been taking any non-prescription drugs, vitamins, minerals, herbals or other nutritional supplements   Any of these may interact with some food or medicines and cause a reaction

## 2022-01-21 ENCOUNTER — ANESTHESIA (OUTPATIENT)
Dept: PERIOP | Facility: HOSPITAL | Age: 32
End: 2022-01-21
Payer: COMMERCIAL

## 2022-01-21 ENCOUNTER — HOSPITAL ENCOUNTER (OUTPATIENT)
Facility: HOSPITAL | Age: 32
Setting detail: OUTPATIENT SURGERY
Discharge: HOME | End: 2022-01-21
Attending: SURGERY | Admitting: SURGERY
Payer: COMMERCIAL

## 2022-01-21 VITALS
RESPIRATION RATE: 16 BRPM | WEIGHT: 140 LBS | HEART RATE: 68 BPM | TEMPERATURE: 98.3 F | BODY MASS INDEX: 23.32 KG/M2 | DIASTOLIC BLOOD PRESSURE: 63 MMHG | SYSTOLIC BLOOD PRESSURE: 114 MMHG | OXYGEN SATURATION: 100 % | HEIGHT: 65 IN

## 2022-01-21 DIAGNOSIS — G56.02 CARPAL TUNNEL SYNDROME ON LEFT: ICD-10-CM

## 2022-01-21 DIAGNOSIS — G56.01 CARPAL TUNNEL SYNDROME ON RIGHT: ICD-10-CM

## 2022-01-21 DIAGNOSIS — Z47.89 AFTERCARE FOLLOWING SURGERY OF THE MUSCULOSKELETAL SYSTEM: ICD-10-CM

## 2022-01-21 DIAGNOSIS — G56.22 CUBITAL TUNNEL SYNDROME ON LEFT: Primary | ICD-10-CM

## 2022-01-21 LAB
EXT PREGNANCY TEST URINE: NEGATIVE
EXT. CONTROL: NORMAL

## 2022-01-21 PROCEDURE — 15736 MUSCLE-SKIN GRAFT ARM: CPT | Performed by: SURGERY

## 2022-01-21 PROCEDURE — 81025 URINE PREGNANCY TEST: CPT | Performed by: SURGERY

## 2022-01-21 PROCEDURE — 64721 CARPAL TUNNEL SURGERY: CPT | Performed by: SURGERY

## 2022-01-21 PROCEDURE — NC001 PR NO CHARGE: Performed by: SURGERY

## 2022-01-21 PROCEDURE — 64718 REVISE ULNAR NERVE AT ELBOW: CPT | Performed by: SURGERY

## 2022-01-21 RX ORDER — LIDOCAINE HYDROCHLORIDE 10 MG/ML
INJECTION, SOLUTION EPIDURAL; INFILTRATION; INTRACAUDAL; PERINEURAL AS NEEDED
Status: DISCONTINUED | OUTPATIENT
Start: 2022-01-21 | End: 2022-01-21

## 2022-01-21 RX ORDER — ALBUTEROL SULFATE 2.5 MG/3ML
2.5 SOLUTION RESPIRATORY (INHALATION) ONCE AS NEEDED
Status: DISCONTINUED | OUTPATIENT
Start: 2022-01-21 | End: 2022-01-21 | Stop reason: HOSPADM

## 2022-01-21 RX ORDER — MAGNESIUM HYDROXIDE 1200 MG/15ML
LIQUID ORAL AS NEEDED
Status: DISCONTINUED | OUTPATIENT
Start: 2022-01-21 | End: 2022-01-21 | Stop reason: HOSPADM

## 2022-01-21 RX ORDER — CEFAZOLIN SODIUM 2 G/50ML
2000 SOLUTION INTRAVENOUS
Status: COMPLETED | OUTPATIENT
Start: 2022-01-22 | End: 2022-01-21

## 2022-01-21 RX ORDER — DEXAMETHASONE SODIUM PHOSPHATE 10 MG/ML
INJECTION, SOLUTION INTRAMUSCULAR; INTRAVENOUS AS NEEDED
Status: DISCONTINUED | OUTPATIENT
Start: 2022-01-21 | End: 2022-01-21

## 2022-01-21 RX ORDER — FENTANYL CITRATE 50 UG/ML
INJECTION, SOLUTION INTRAMUSCULAR; INTRAVENOUS AS NEEDED
Status: DISCONTINUED | OUTPATIENT
Start: 2022-01-21 | End: 2022-01-21

## 2022-01-21 RX ORDER — MIDAZOLAM HYDROCHLORIDE 2 MG/2ML
INJECTION, SOLUTION INTRAMUSCULAR; INTRAVENOUS AS NEEDED
Status: DISCONTINUED | OUTPATIENT
Start: 2022-01-21 | End: 2022-01-21

## 2022-01-21 RX ORDER — HYDROCODONE BITARTRATE AND ACETAMINOPHEN 5; 325 MG/1; MG/1
1 TABLET ORAL EVERY 6 HOURS PRN
Qty: 12 TABLET | Refills: 0 | Status: SHIPPED | OUTPATIENT
Start: 2022-01-21 | End: 2022-01-31

## 2022-01-21 RX ORDER — PROPOFOL 10 MG/ML
INJECTION, EMULSION INTRAVENOUS AS NEEDED
Status: DISCONTINUED | OUTPATIENT
Start: 2022-01-21 | End: 2022-01-21

## 2022-01-21 RX ORDER — ONDANSETRON 2 MG/ML
4 INJECTION INTRAMUSCULAR; INTRAVENOUS ONCE AS NEEDED
Status: DISCONTINUED | OUTPATIENT
Start: 2022-01-21 | End: 2022-01-21 | Stop reason: HOSPADM

## 2022-01-21 RX ORDER — HYDROCODONE BITARTRATE AND ACETAMINOPHEN 5; 325 MG/1; MG/1
1 TABLET ORAL EVERY 6 HOURS PRN
Status: DISCONTINUED | OUTPATIENT
Start: 2022-01-21 | End: 2022-01-21 | Stop reason: HOSPADM

## 2022-01-21 RX ORDER — SODIUM CHLORIDE, SODIUM LACTATE, POTASSIUM CHLORIDE, CALCIUM CHLORIDE 600; 310; 30; 20 MG/100ML; MG/100ML; MG/100ML; MG/100ML
INJECTION, SOLUTION INTRAVENOUS CONTINUOUS PRN
Status: DISCONTINUED | OUTPATIENT
Start: 2022-01-21 | End: 2022-01-21

## 2022-01-21 RX ORDER — CEFAZOLIN SODIUM 2 G/50ML
2000 SOLUTION INTRAVENOUS ONCE
Status: DISCONTINUED | OUTPATIENT
Start: 2022-01-21 | End: 2022-01-21 | Stop reason: HOSPADM

## 2022-01-21 RX ORDER — BUPIVACAINE HYDROCHLORIDE 5 MG/ML
INJECTION, SOLUTION PERINEURAL
Status: COMPLETED | OUTPATIENT
Start: 2022-01-21 | End: 2022-01-21

## 2022-01-21 RX ORDER — PROPOFOL 10 MG/ML
INJECTION, EMULSION INTRAVENOUS CONTINUOUS PRN
Status: DISCONTINUED | OUTPATIENT
Start: 2022-01-21 | End: 2022-01-21

## 2022-01-21 RX ADMIN — PROPOFOL 80 MG: 10 INJECTION, EMULSION INTRAVENOUS at 08:57

## 2022-01-21 RX ADMIN — SODIUM CHLORIDE, SODIUM LACTATE, POTASSIUM CHLORIDE, AND CALCIUM CHLORIDE: .6; .31; .03; .02 INJECTION, SOLUTION INTRAVENOUS at 08:55

## 2022-01-21 RX ADMIN — PROPOFOL 30 MG: 10 INJECTION, EMULSION INTRAVENOUS at 09:09

## 2022-01-21 RX ADMIN — DEXAMETHASONE SODIUM PHOSPHATE 5 MG: 10 INJECTION, SOLUTION INTRAMUSCULAR; INTRAVENOUS at 08:42

## 2022-01-21 RX ADMIN — FENTANYL CITRATE 50 MCG: 50 INJECTION INTRAMUSCULAR; INTRAVENOUS at 08:42

## 2022-01-21 RX ADMIN — MIDAZOLAM 1 MG: 1 INJECTION INTRAMUSCULAR; INTRAVENOUS at 08:40

## 2022-01-21 RX ADMIN — CEFAZOLIN SODIUM 2000 MG: 2 SOLUTION INTRAVENOUS at 09:00

## 2022-01-21 RX ADMIN — MIDAZOLAM 1 MG: 1 INJECTION INTRAMUSCULAR; INTRAVENOUS at 08:42

## 2022-01-21 RX ADMIN — BUPIVACAINE HYDROCHLORIDE 25 ML: 5 INJECTION, SOLUTION PERINEURAL at 08:42

## 2022-01-21 RX ADMIN — FENTANYL CITRATE 50 MCG: 50 INJECTION INTRAMUSCULAR; INTRAVENOUS at 08:40

## 2022-01-21 RX ADMIN — LIDOCAINE HYDROCHLORIDE 50 MG: 10 INJECTION, SOLUTION EPIDURAL; INFILTRATION; INTRACAUDAL; PERINEURAL at 08:57

## 2022-01-21 RX ADMIN — PROPOFOL 50 MCG/KG/MIN: 10 INJECTION, EMULSION INTRAVENOUS at 08:57

## 2022-01-21 RX ADMIN — PROPOFOL 40 MG: 10 INJECTION, EMULSION INTRAVENOUS at 09:55

## 2022-01-21 NOTE — ANESTHESIA PREPROCEDURE EVALUATION
Procedure:  RELEASE CUBITAL TUNNEL (Left Elbow)  RELEASE CARPAL TUNNEL (Left Wrist)    Relevant Problems   ANESTHESIA   (+) PONV (postoperative nausea and vomiting)      GI/HEPATIC   (+) Oropharyngeal dysphagia      MUSCULOSKELETAL   (+) Dermatomyositis (HCC)      Adequately NPO for procedure  Good functional capacity  Hx of PONV with GA  Physical Exam    Airway    Mallampati score: III  TM Distance: >3 FB  Neck ROM: full     Dental   No notable dental hx     Cardiovascular  Rhythm: regular, Rate: normal,     Pulmonary  Pulmonary exam normal     Other Findings        Anesthesia Plan  ASA Score- 2     Anesthesia Type- IV sedation with anesthesia and regional with ASA Monitors  Additional Monitors:   Airway Plan:     Comment: Spontaneous with supplemental O2  Plan Factors-Exercise tolerance (METS): >4 METS  Chart reviewed  Patient summary reviewed  Patient is not a current smoker  Induction- intravenous  Postoperative Plan- Plan for postoperative opioid use  Informed Consent- Anesthetic plan and risks discussed with patient  I personally reviewed this patient with the CRNA  Discussed and agreed on the Anesthesia Plan with the CRNA  Sun Ying

## 2022-01-21 NOTE — OP NOTE
OPERATIVE REPORT  PATIENT NAME: Maddy Yoon  :  1990  MRN: [id number]  Pt Location: [facility]    SURGERY DATE: 22    Surgeon(s) and Role:     * Tony De Leon MD - Primary     * Grace Meredith MD - Assisting     * Natalie Medrano PA-C - Assisting    Pre-Op Diagnosis:  Carpal tunnel syndrome on left [G56 02]  Cubital tunnel syndrome on left [G56 22]    Post-Op Diagnosis Codes:     * Carpal tunnel syndrome on left [G56 02]     * Cubital tunnel syndrome on left [G56 22]    Procedure(s):  RELEASE CUBITAL TUNNEL WITH ULNAR NERVE TRANSPOSITION (Left)  RELEASE CARPAL TUNNEL (Left)   Pedicle based fat flap for anterior transposition of the elbow (66918)    Specimen(s):  * No orders in the log *    Estimated Blood Loss:   Minimal    Anesthesia Type:   Regional with Sedation    IMPLANTS:  * No implants in log *    PERIOPERATIVE ANTIBIOTICS:    cefazolin, 2 grams    Tourniquet Time: 41 min  at 250 mmHg          Operative Indications: The patient has a history of Carpal Tunnel Syndrome  left and Cubital Tunnel Syndrome  left that was recalcitrant to conservative management  The decision was made to bring the patient to the operating room for Open Carpal Tunnel Release  left and Cubital Tunnel Release  left  Risks of the procedure were explained which include, but are not limited to bleeding; infection; damage to nerves, arteries,veins, tendons; scar; pain; need for reoperation; failure to give desired result; and risks of anaesthesia  All questions were answered to satisfaction and they were willing to proceed  Operative Findings:  Hypertrophy TCL  Subluxed ulnar nerve prior to transposition   Compression ulnar nerve of deep FCU fascia     Complications:   None    Procedure and Technique:  After the patient, site, and procedure were identified, the patient was brought into the operating room in a supine position  Regional anaesthesia and sedation were provided    A well padded tourniquet was applied to the extremity, set at 250 mmHg  The  left upper extremity was then prepped and drapped in a normal, sterile, orthopedic fashion  An anterior approach to the carpal tunnel was undertaken  A 2 cm incision was made in line with the fourth digit, proximal to Aguilar's line  The skin and the subcutaneous tissue were sharply incised  Under loupe magnification, dissection was carried down to the palmar fascia and it was incised  The transverse carpal ligament was visualized and  Released distally with a #64 blade  A freer was was passed above and below the TCL in a retrograde fashion, freeing up any adhesions  A Knife Lite was used to release the proximal portion of the transverse carpal ligament under direct visualization  Distally the superficial arch was identified  A complete release was carried out and exploration of the carpal canal revealed no significant tenosynovitis  The median nerve and its branches were intact  Incision was made with a 15 blade between medial epicondyle and olecranon  two branch of the MABC were identified distally and protected  The ulnar nerve was identified and decompressed at the level of the cubital tunnel  It was found to be significantly compressed with an hourglass deformity just proximal to the deep fascia of the fcu   The Ulnar nerve was traced both proximally and distally  Proximal release was performed and nerve was released distal to the first branch of the FCU  The Elbow was taken through a full range of motion and the nerve was found to sublux     Further release was undertaken proximally  The intermuscular septum was identified and excised to prevent constriction after on the transposed nerve  The nerve was dissected from the underlying vasculature       A pedicle, fascial fat flap at the medial elbow using the deep subcutaneous tissue based on the lateral perforating vessels was isolated from the superficial subcutaneous tissue layer using the super fascial fascial planes  The flap was noted to be well-perfused following tourniquet deflation  The flap was transposed anteriorly to the ulnar nerve after anterior transposition of the nerve was performed  The flap was secured to the posterior subcutaneous tissue using an interrupted 3 0 Vicryl suture with care as to not incorporate the ulnar nerve or superficial sensory nerves within the repair site  The ulnar nerve was visualized directly to glide freely without tethering or subluxation between the subcutaneous flap and the overlying subcutaneous tissues with elbow flexion and extension  At the completion of the procedure, hemostasis was obtained with cautery and direct pressure  The wounds were copiously irrigated with sterile solution  The wounds were closed with Prolene, Monocryl and Steri-strips  Sterile dressings were applied, including Xeroform, gauze, tweeners, webril, ACE and Sling  Please note, all sponge, needle, and instrument counts were correct prior to closure  Loupe magnification was utilized  The patient tolerated the procedure well       I was present for the entire procedure, A qualified resident physician was not available and A physician assistant was required during the procedure for retraction tissue handling,dissection and suturing    Patient Disposition:  PACU     SIGNATURE: Quyen Jovel MD  DATE: 01/21/22  TIME: 9:58 AM

## 2022-01-21 NOTE — ANESTHESIA PROCEDURE NOTES
Peripheral Block    Patient location during procedure: pre-op  Start time: 1/21/2022 8:35 AM  Reason for block: at surgeon's request and post-op pain management  Staffing  Performed: Anesthesiologist   Anesthesiologist: Varun Castanon DO  Preanesthetic Checklist  Completed: patient identified, IV checked, site marked, risks and benefits discussed, surgical consent, monitors and equipment checked, pre-op evaluation and timeout performed  Peripheral Block  Patient position: supine  Prep: ChloraPrep  Patient monitoring: heart rate, continuous pulse ox and frequent blood pressure checks  Block type: axillary  Laterality: right  Injection technique: single-shot  Procedures: ultrasound guided, Ultrasound guidance required for the procedure to increase accuracy and safety of medication placement and decrease risk of complications  bupivacaine (MARCAINE) 0 5 % perineural infiltration, 25 mL (with 5mg of dexamethasone)  Needle  Needle type: StimupNCR   Needle gauge: 22 G  Needle length: 4 inch  Needle localization: ultrasound guidance  Assessment  Injection assessment: incremental injection, local visualized surrounding nerve on ultrasound, negative aspiration for heme and no paresthesia on injection  Paresthesia pain: none  Heart rate change: no  Slow fractionated injection: yes  Post-procedure:  site cleaned  patient tolerated the procedure well with no immediate complications  Additional Notes  Left sided Axillary nerve block performed preoperatively under US guidance for intraoperative and postoperative pain control  A total of 25ml of 0 5% bupivacaine with 5mg of dexamethasone was injected  No complications noted

## 2022-01-21 NOTE — ANESTHESIA POSTPROCEDURE EVALUATION
Post-Op Assessment Note    CV Status:  Stable  Pain Score: 0    Pain management: adequate     Mental Status:  Arousable and sleepy   Hydration Status:  Euvolemic   PONV Controlled:  Controlled   Airway Patency:  Patent      Post Op Vitals Reviewed: Yes      Staff: CRNA         No complications documented      BP   90/69   temp 97 3   Pulse 77   Resp 15   SpO2 100%

## 2022-01-21 NOTE — H&P
Assessment/Plan:  d desensitization   Activity as tolerated     Patient is having similar paresthesias on the left side, particularly on the long, ring, and small fingers  She has not undergone any testing on the left side as of yet  Ultrasounds were ordered today for the left carpal and cubital tunnels today  We discussed surgical intervention including risks, benefits, and alternatives and informed consent was signed for left open carpal tunnel and left cubital tunnel releases  Will adjust if needed depending on ultrasound results  posotp protocol and expectations reviewed, we will follow up with her 10-14 days postop       Follow Up: After Surgery     To Do Next Visit:  Sutures out        CHIEF COMPLAINT:      Chief Complaint   Patient presents with    Right Wrist - Post-op    Right Elbow - Post-op    Left Wrist - Follow-up, Pain    Left Elbow - Pain, Follow-up            SUBJECTIVE:  Thomas Salazar is a 33y o  year old female who presents for follow up after Release Cubital Tunnel:  Anterior Transpositon Adipose Tissue Pedicle Flap - Right and Release Carpal Tunnel - Right  Today patient has some hypersensitivity at the elbow incision but is otherwise doing well  No fevers or chills  No residual paresthesias other than occasional positional symptoms at the elbow, however these resolve rapidly   She is having similar symptoms on the left including intermittent and nocturnal paresthesias in the long, ring, and small fingers       Review of Systems - History obtained from the patient  General ROS: negative for - chills, fatigue or fever  ENT ROS: negative  Respiratory ROS: no cough, shortness of breath, or wheezing  Cardiovascular ROS: no chest pain or dyspnea on exertion  Musculoskeletal ROS: negative for - gait disturbance, joint pain, joint stiffness or muscle pain  Neurological ROS: no TIA or stroke symptoms  positive for - numbness/tingling  Dermatological ROS: negative            Vitals:     10/20/21 1040   BP: 105/68   Pulse: 73   Resp: 17            PHYSICAL EXAMINATION:  General: well developed and well nourished, alert, oriented times 3 and appears comfortable  Psychiatric: Normal  Abdominal: no distention, no tenderness to palpation   HEENT: Normocephalic, Atraumatic Trachea Midline, No torticollis  Pulmonary: No audible wheezing or respiratory distress   Cardiovascular: No pitting edema, 2+ radial pulse   Skin: No masses, erythema, lacerations, fluctation, ulcerations  Musculoskeletal: Normal, except as noted in detailed exam and in HPI         MUSCULOSKELETAL EXAMINATION:  Incision: Clean, dry, intact  Surgery Site: normal, no evidence of infection   Range of Motion: As expected, opposition intact and full composite fist possible  Neurovascular status: Neuro intact, good cap refill  Activity Restrictions: No restrictions  Done today: Sutures out     Left Ulnar Nerve Exam:     Negative intrinsic atrophy  Negative  deformity at the elbow  Full range of motion with flexion and extension of the elbow  Positive ulnar nerve compression test at the elbow  Positive tinels over the ulnar nerve at the elbow  Negative cross finger test in the index and long fingers  FDP strength is 5/5 to the ring finger  FDP strength is 5/5 to the small finger  Intrinsic strength 5/5   Left Carpal Tunnel Exam:     Negative thenar atrophy  Positive phalen's test  Positive carpal tunnel compression  Negative tinels over median nerve at the wrist   Opposition strength 5/5    Abduction strength 5/5

## 2022-01-21 NOTE — DISCHARGE INSTRUCTIONS
Post Operative Instructions    You have had surgery on your arm today, please read and follow the information below:  · Elevate your hand above your elbow during the next 24-48 hours to help with swelling  · Place your hand and arm over your head with motion at your shoulder three times a day  · Do not apply any cream/ointment/oil to your incisions including antibiotics  · Do not soak your hands in standing water (dishwater, tubs, Jacuzzi's, pools, etc ) until given permission (typically 2-3 weeks after injury)    Call the office if you notice any:  · Increased numbness or tingling of your hand or fingers that is not relieved with elevation  · Increasing pain that is not controlled with medication  · Difficulty chewing, breathing, swallowing  · Chest pains or shortness of breath  · Fever over 101 4 degrees  Bandage: Please keep bandages clean and dry  Remove bandage after 5 days  Once bandages are removed you may wash hands with soap and water  Short showers are okay as well, but please avoid soaking the hand as described above (ie no pools, baths, dirty dish water, hot tubs, ocean/lake water, etc)  Sutures and steri strips will fall off on their own or be removed in the office at your first follow up visit, please do not remove them yourself  Please do NOT put any topical agents on the surgical wound including neosporin, peroxide, tea tree oil, vitamin E, etc  as these can delay wound healing  Motion: Move fingers into a fist 5 times a day, DO NOT move any splinted fingers  Weight bearing status: Avoid heavy lifting (>5 pounds) with the extremity that was operated on until follow up appointment  Normal activities of daily living are OK  Ice: Ice for 10 minutes every hour as needed for swelling x 24 hours  Sling: Sling for comfort for 2-3 days  Discontinue sling when comfortable       Pain medication:   Naproxen 220 mg two time a day (do not take this medication if you were told by your doctor that you cannot take anti-inflammatories or NSAIDS)  Tylenol Extended Release 650 mg every 8 hours  Norco/Hydrocodone one tab every 6 hours ONLY AS NEEDED for severe pain         Follow-up Appointment: 10-14 days with Dr Estrellita Kaplan        Please call the office if you have any questions or concerns regarding your post-operative care

## 2022-02-02 ENCOUNTER — OFFICE VISIT (OUTPATIENT)
Dept: OBGYN CLINIC | Facility: CLINIC | Age: 32
End: 2022-02-02

## 2022-02-02 VITALS
WEIGHT: 143.2 LBS | BODY MASS INDEX: 23.86 KG/M2 | DIASTOLIC BLOOD PRESSURE: 78 MMHG | SYSTOLIC BLOOD PRESSURE: 114 MMHG | HEIGHT: 65 IN | HEART RATE: 56 BPM

## 2022-02-02 DIAGNOSIS — G56.02 CARPAL TUNNEL SYNDROME ON LEFT: Primary | ICD-10-CM

## 2022-02-02 DIAGNOSIS — G56.22 CUBITAL TUNNEL SYNDROME ON LEFT: ICD-10-CM

## 2022-02-02 PROCEDURE — 3008F BODY MASS INDEX DOCD: CPT | Performed by: PSYCHIATRY & NEUROLOGY

## 2022-02-02 PROCEDURE — 99024 POSTOP FOLLOW-UP VISIT: CPT | Performed by: SURGERY

## 2022-02-02 NOTE — PROGRESS NOTES
Assessment/Plan:  Patient ID: Mu Ingram 33 y o  female   Surgery: Release Cubital Tunnel With Ulnar Nerve Transposition - Left and Release Carpal Tunnel - Left  Date of Surgery: 1/21/2022    The numbness in the ring and small fingers will gradually get better over time  Encouraged to perform scar massage over the incisions  Follow Up:  PRN    To Do Next Visit:  Re-evaluate      CHIEF COMPLAINT:  Chief Complaint   Patient presents with    Left Elbow - Post-op, Suture / Staple Removal    Left Wrist - Post-op, Suture / Staple Removal         SUBJECTIVE:  Mu Ingram is a 33y o  year old female who presents for follow up after Release Cubital Tunnel With Ulnar Nerve Transposition - Left and Release Carpal Tunnel - Left  Today patient has Numbness in the small and ring fingers         PHYSICAL EXAMINATION:  General: well developed and well nourished, alert, oriented times 3 and appears comfortable  Psychiatric: Normal    MUSCULOSKELETAL EXAMINATION:  Incision: Clean, dry, intact, healed and suture(s) intact   Surgery Site: normal, no evidence of infection  and swelling present  Range of Motion: As expected, opposition intact and full composite fist possible  Neurovascular status: Neuro intact, good cap refill, radial pulse 2+ and numbness note in the ring and small fingers  Activity Restrictions: No restrictions  Done today: Sutures out      STUDIES REVIEWED:  No Studies to review      PROCEDURES PERFORMED:  Procedures  No Procedures performed today    Scribe Attestation    I,:  Tirso Ruiz am acting as a scribe while in the presence of the attending physician :       I,:  Yocasta Isbell MD personally performed the services described in this documentation    as scribed in my presence :

## 2022-02-22 ENCOUNTER — OFFICE VISIT (OUTPATIENT)
Dept: NEUROLOGY | Facility: CLINIC | Age: 32
End: 2022-02-22
Payer: COMMERCIAL

## 2022-02-22 VITALS
SYSTOLIC BLOOD PRESSURE: 112 MMHG | BODY MASS INDEX: 23.61 KG/M2 | DIASTOLIC BLOOD PRESSURE: 74 MMHG | WEIGHT: 141.9 LBS | HEART RATE: 66 BPM

## 2022-02-22 DIAGNOSIS — M33.90 DERMATOMYOSITIS (HCC): Primary | ICD-10-CM

## 2022-02-22 PROCEDURE — 1036F TOBACCO NON-USER: CPT | Performed by: PSYCHIATRY & NEUROLOGY

## 2022-02-22 PROCEDURE — 99214 OFFICE O/P EST MOD 30 MIN: CPT | Performed by: PSYCHIATRY & NEUROLOGY

## 2022-02-22 NOTE — PATIENT INSTRUCTIONS
1  Start methotrexate 10 mg once weekly  2  Start monthly lab monitoring (CBC, CMP, ESR, CK)  3  Continue folate supplementation  4   Eventually we will start tapering the prednisone but for the next couple of months keep at the same dose - call after 2 months to start tapering process if all symptoms have been stable
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
## 2022-02-22 NOTE — ASSESSMENT & PLAN NOTE
Reilly Guerra is a 33 y o  female w/ recently confirmed dermatomyositis here for f/u  Has been maintained on prednisone since her initial visit and has noted significant improvement in both her skin and muscle sx  She notes this still causes significant mood disturbance for her though no other significant steroid-related side effects     Bx confirmed dx in 2021   Significant improvement in weakness and rash    Plan:   Initiate methotrexate 10 mg weekly and continue folic acid supplementation   CBC, CMP, ESR, CK monthly   Continue prednisone 20 mg qd for at lest 2 months while initiating methotrexate   Given continued steroid use, continue GI prophylaxis with Pepcid, calcium and vitamin-D

## 2022-03-07 NOTE — PROGRESS NOTES
Patient's Lab: [facility]     P:2   Last Yearly: 20  Last Pap: 20 Due '  Results: -/-  BC: Withdrawl  LMP: 3/7/22  S/P HPV Series: Completed  S/P Covid Shot: No  Sexually Active: Yes  STI Testing: No    HX: C/S x 2    Family hx of breast cancer: No  Family hx of ovarian cancer: No  Family hx of colon cancer: No  Pt  Reports being adopted and uncertain or family hx      Q's/Concerns: Just wants you check out her  scar to be sure she is just feeling scar tissue
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
## 2022-03-11 ENCOUNTER — OFFICE VISIT (OUTPATIENT)
Dept: OBGYN CLINIC | Facility: MEDICAL CENTER | Age: 32
End: 2022-03-11
Payer: COMMERCIAL

## 2022-03-11 VITALS
WEIGHT: 140.4 LBS | HEIGHT: 65 IN | DIASTOLIC BLOOD PRESSURE: 70 MMHG | BODY MASS INDEX: 23.39 KG/M2 | SYSTOLIC BLOOD PRESSURE: 102 MMHG

## 2022-03-11 DIAGNOSIS — Z01.419 ROUTINE GYNECOLOGICAL EXAMINATION: Primary | ICD-10-CM

## 2022-03-11 PROCEDURE — S0612 ANNUAL GYNECOLOGICAL EXAMINA: HCPCS | Performed by: OBSTETRICS & GYNECOLOGY

## 2022-03-11 PROCEDURE — 3008F BODY MASS INDEX DOCD: CPT | Performed by: PSYCHIATRY & NEUROLOGY

## 2022-03-17 DIAGNOSIS — M33.90 DERMATOMYOSITIS (HCC): ICD-10-CM

## 2022-03-17 RX ORDER — PREDNISONE 20 MG/1
TABLET ORAL
Qty: 30 TABLET | Refills: 1 | Status: SHIPPED | OUTPATIENT
Start: 2022-03-17 | End: 2022-05-16 | Stop reason: SDUPTHER

## 2022-04-29 DIAGNOSIS — M33.90 DERMATOMYOSITIS (HCC): ICD-10-CM

## 2022-05-04 ENCOUNTER — APPOINTMENT (OUTPATIENT)
Dept: LAB | Facility: HOSPITAL | Age: 32
End: 2022-05-04
Payer: COMMERCIAL

## 2022-05-04 LAB
ALBUMIN SERPL BCP-MCNC: 4.2 G/DL (ref 3.5–5)
ALP SERPL-CCNC: 37 U/L (ref 46–116)
ALT SERPL W P-5'-P-CCNC: 23 U/L (ref 12–78)
ANION GAP SERPL CALCULATED.3IONS-SCNC: 11 MMOL/L (ref 4–13)
AST SERPL W P-5'-P-CCNC: 15 U/L (ref 5–45)
BASOPHILS # BLD AUTO: 0.02 THOUSANDS/ΜL (ref 0–0.1)
BASOPHILS NFR BLD AUTO: 0 % (ref 0–1)
BILIRUB SERPL-MCNC: 2.13 MG/DL (ref 0.2–1)
BUN SERPL-MCNC: 15 MG/DL (ref 5–25)
CALCIUM SERPL-MCNC: 9.5 MG/DL (ref 8.3–10.1)
CHLORIDE SERPL-SCNC: 102 MMOL/L (ref 100–108)
CK SERPL-CCNC: 65 U/L (ref 26–192)
CO2 SERPL-SCNC: 28 MMOL/L (ref 21–32)
CREAT SERPL-MCNC: 0.76 MG/DL (ref 0.6–1.3)
EOSINOPHIL # BLD AUTO: 0.03 THOUSAND/ΜL (ref 0–0.61)
EOSINOPHIL NFR BLD AUTO: 0 % (ref 0–6)
ERYTHROCYTE [DISTWIDTH] IN BLOOD BY AUTOMATED COUNT: 13 % (ref 11.6–15.1)
ERYTHROCYTE [SEDIMENTATION RATE] IN BLOOD: 5 MM/HOUR (ref 0–19)
GFR SERPL CREATININE-BSD FRML MDRD: 102 ML/MIN/1.73SQ M
GLUCOSE P FAST SERPL-MCNC: 97 MG/DL (ref 65–99)
HCT VFR BLD AUTO: 44 % (ref 34.8–46.1)
HGB BLD-MCNC: 13.9 G/DL (ref 11.5–15.4)
IMM GRANULOCYTES # BLD AUTO: 0.06 THOUSAND/UL (ref 0–0.2)
IMM GRANULOCYTES NFR BLD AUTO: 1 % (ref 0–2)
LYMPHOCYTES # BLD AUTO: 0.83 THOUSANDS/ΜL (ref 0.6–4.47)
LYMPHOCYTES NFR BLD AUTO: 8 % (ref 14–44)
MCH RBC QN AUTO: 31.7 PG (ref 26.8–34.3)
MCHC RBC AUTO-ENTMCNC: 31.6 G/DL (ref 31.4–37.4)
MCV RBC AUTO: 101 FL (ref 82–98)
MONOCYTES # BLD AUTO: 0.46 THOUSAND/ΜL (ref 0.17–1.22)
MONOCYTES NFR BLD AUTO: 5 % (ref 4–12)
NEUTROPHILS # BLD AUTO: 8.65 THOUSANDS/ΜL (ref 1.85–7.62)
NEUTS SEG NFR BLD AUTO: 86 % (ref 43–75)
NRBC BLD AUTO-RTO: 0 /100 WBCS
PLATELET # BLD AUTO: 330 THOUSANDS/UL (ref 149–390)
PMV BLD AUTO: 9.2 FL (ref 8.9–12.7)
POTASSIUM SERPL-SCNC: 3.8 MMOL/L (ref 3.5–5.3)
PROT SERPL-MCNC: 7 G/DL (ref 6.4–8.2)
RBC # BLD AUTO: 4.38 MILLION/UL (ref 3.81–5.12)
SODIUM SERPL-SCNC: 141 MMOL/L (ref 136–145)
WBC # BLD AUTO: 10.05 THOUSAND/UL (ref 4.31–10.16)

## 2022-05-04 PROCEDURE — 36415 COLL VENOUS BLD VENIPUNCTURE: CPT | Performed by: PSYCHIATRY & NEUROLOGY

## 2022-05-04 PROCEDURE — 85652 RBC SED RATE AUTOMATED: CPT | Performed by: PSYCHIATRY & NEUROLOGY

## 2022-05-04 PROCEDURE — 85025 COMPLETE CBC W/AUTO DIFF WBC: CPT | Performed by: PSYCHIATRY & NEUROLOGY

## 2022-05-04 PROCEDURE — 80053 COMPREHEN METABOLIC PANEL: CPT | Performed by: PSYCHIATRY & NEUROLOGY

## 2022-05-04 PROCEDURE — 82550 ASSAY OF CK (CPK): CPT | Performed by: PSYCHIATRY & NEUROLOGY

## 2022-05-16 DIAGNOSIS — M33.90 DERMATOMYOSITIS (HCC): ICD-10-CM

## 2022-05-17 RX ORDER — PREDNISONE 20 MG/1
20 TABLET ORAL DAILY
Qty: 30 TABLET | Refills: 0 | Status: SHIPPED | OUTPATIENT
Start: 2022-05-17 | End: 2022-06-20 | Stop reason: SDUPTHER

## 2022-06-01 ENCOUNTER — APPOINTMENT (OUTPATIENT)
Dept: LAB | Facility: HOSPITAL | Age: 32
End: 2022-06-01
Payer: COMMERCIAL

## 2022-06-20 DIAGNOSIS — M33.90 DERMATOMYOSITIS (HCC): ICD-10-CM

## 2022-06-20 RX ORDER — PREDNISONE 20 MG/1
20 TABLET ORAL DAILY
Qty: 30 TABLET | Refills: 0 | Status: SHIPPED | OUTPATIENT
Start: 2022-06-20 | End: 2022-07-22 | Stop reason: SDUPTHER

## 2022-07-01 ENCOUNTER — TELEPHONE (OUTPATIENT)
Dept: NEUROLOGY | Facility: CLINIC | Age: 32
End: 2022-07-01

## 2022-07-01 DIAGNOSIS — Z79.899 LONG TERM CURRENT USE OF IMMUNOSUPPRESSIVE DRUG: ICD-10-CM

## 2022-07-01 DIAGNOSIS — M33.90 DERMATOMYOSITIS (HCC): Primary | ICD-10-CM

## 2022-07-01 NOTE — TELEPHONE ENCOUNTER
Pt called and states that she normally gets routine bw  She is transitioning from prednisone to methotrexate  Requesting bw script be placed    Per your office notes on 2/22/22-CBC, CMP, ESR, CK monthly  [phone], ok to leave detailed message

## 2022-07-07 ENCOUNTER — APPOINTMENT (OUTPATIENT)
Dept: LAB | Facility: CLINIC | Age: 32
End: 2022-07-07
Payer: COMMERCIAL

## 2022-07-07 PROCEDURE — 80053 COMPREHEN METABOLIC PANEL: CPT

## 2022-07-07 PROCEDURE — 85025 COMPLETE CBC W/AUTO DIFF WBC: CPT

## 2022-07-07 PROCEDURE — 36415 COLL VENOUS BLD VENIPUNCTURE: CPT

## 2022-07-07 PROCEDURE — 82550 ASSAY OF CK (CPK): CPT

## 2022-07-07 PROCEDURE — 85652 RBC SED RATE AUTOMATED: CPT

## 2022-07-08 LAB
ALBUMIN SERPL BCP-MCNC: 3.7 G/DL (ref 3.5–5)
ALP SERPL-CCNC: 32 U/L (ref 46–116)
ALT SERPL W P-5'-P-CCNC: 18 U/L (ref 12–78)
ANION GAP SERPL CALCULATED.3IONS-SCNC: 5 MMOL/L (ref 4–13)
AST SERPL W P-5'-P-CCNC: 12 U/L (ref 5–45)
BASOPHILS # BLD AUTO: 0.03 THOUSANDS/ΜL (ref 0–0.1)
BASOPHILS NFR BLD AUTO: 0 % (ref 0–1)
BILIRUB SERPL-MCNC: 0.93 MG/DL (ref 0.2–1)
BUN SERPL-MCNC: 16 MG/DL (ref 5–25)
CALCIUM SERPL-MCNC: 9.3 MG/DL (ref 8.3–10.1)
CHLORIDE SERPL-SCNC: 109 MMOL/L (ref 100–108)
CK SERPL-CCNC: 73 U/L (ref 26–192)
CO2 SERPL-SCNC: 28 MMOL/L (ref 21–32)
CREAT SERPL-MCNC: 0.79 MG/DL (ref 0.6–1.3)
EOSINOPHIL # BLD AUTO: 0.07 THOUSAND/ΜL (ref 0–0.61)
EOSINOPHIL NFR BLD AUTO: 1 % (ref 0–6)
ERYTHROCYTE [DISTWIDTH] IN BLOOD BY AUTOMATED COUNT: 13.2 % (ref 11.6–15.1)
ERYTHROCYTE [SEDIMENTATION RATE] IN BLOOD: 5 MM/HOUR (ref 0–19)
GFR SERPL CREATININE-BSD FRML MDRD: 97 ML/MIN/1.73SQ M
GLUCOSE P FAST SERPL-MCNC: 83 MG/DL (ref 65–99)
HCT VFR BLD AUTO: 42.2 % (ref 34.8–46.1)
HGB BLD-MCNC: 13.1 G/DL (ref 11.5–15.4)
IMM GRANULOCYTES # BLD AUTO: 0.08 THOUSAND/UL (ref 0–0.2)
IMM GRANULOCYTES NFR BLD AUTO: 1 % (ref 0–2)
LYMPHOCYTES # BLD AUTO: 1.19 THOUSANDS/ΜL (ref 0.6–4.47)
LYMPHOCYTES NFR BLD AUTO: 12 % (ref 14–44)
MCH RBC QN AUTO: 32.7 PG (ref 26.8–34.3)
MCHC RBC AUTO-ENTMCNC: 31 G/DL (ref 31.4–37.4)
MCV RBC AUTO: 105 FL (ref 82–98)
MONOCYTES # BLD AUTO: 0.61 THOUSAND/ΜL (ref 0.17–1.22)
MONOCYTES NFR BLD AUTO: 6 % (ref 4–12)
NEUTROPHILS # BLD AUTO: 7.79 THOUSANDS/ΜL (ref 1.85–7.62)
NEUTS SEG NFR BLD AUTO: 80 % (ref 43–75)
NRBC BLD AUTO-RTO: 0 /100 WBCS
PLATELET # BLD AUTO: 336 THOUSANDS/UL (ref 149–390)
PMV BLD AUTO: 9.6 FL (ref 8.9–12.7)
POTASSIUM SERPL-SCNC: 4.4 MMOL/L (ref 3.5–5.3)
PROT SERPL-MCNC: 6.5 G/DL (ref 6.4–8.2)
RBC # BLD AUTO: 4.01 MILLION/UL (ref 3.81–5.12)
SODIUM SERPL-SCNC: 142 MMOL/L (ref 136–145)
WBC # BLD AUTO: 9.77 THOUSAND/UL (ref 4.31–10.16)

## 2022-07-20 DIAGNOSIS — M33.90 DERMATOMYOSITIS (HCC): ICD-10-CM

## 2022-07-20 RX ORDER — PREDNISONE 20 MG/1
20 TABLET ORAL DAILY
Qty: 30 TABLET | Refills: 0 | Status: CANCELLED | OUTPATIENT
Start: 2022-07-20

## 2022-07-22 DIAGNOSIS — M33.90 DERMATOMYOSITIS (HCC): ICD-10-CM

## 2022-07-22 NOTE — TELEPHONE ENCOUNTER
Keara García, please see my below message from 7/7, labs were normal, please let the pt know to decrease the prednisone to 15mg daily  I did place the lab orders that day as well

## 2022-07-22 NOTE — TELEPHONE ENCOUNTER
Patient had labs done on 22  Please review  Patient has responded to your My Chart message on 22:    I copied and pasted to this encounter:    Steve Oliver  to Srikanth Sherwood MD          12:06 PM  Hello I am feeling ok with the medications  I just had the prednisone refilled but it should be done by our next scheduled visit  I would very much like to begin tapering off if my blood work is good  I went last week for my 3rd set of testing but the orders were    I had to request a new set of orders from your office so I can go back this week to get the follow up bloodwork done

## 2022-07-25 RX ORDER — PREDNISONE 20 MG/1
20 TABLET ORAL DAILY
Qty: 30 TABLET | Refills: 0 | Status: SHIPPED | OUTPATIENT
Start: 2022-07-25 | End: 2022-07-27 | Stop reason: DRUGHIGH

## 2022-07-26 NOTE — TELEPHONE ENCOUNTER
Called patient, no answer left VM to call back to go over Dr Steel Every note and decrease and Prednisone and frequency of lab visits also

## 2022-07-26 NOTE — TELEPHONE ENCOUNTER
Pt made aware of below  She states that she just filled 20mg tabs  She is asking if she should just continue this dose until she sees you on 8/9 and then you can send a new script or if you want her to decrease to 15mg now, she would need a new script sent to pharmacy     Please advise  [phone] to leave detailed message or ok to send vidya [place]

## 2022-07-27 RX ORDER — PREDNISONE 10 MG/1
15 TABLET ORAL DAILY
Qty: 45 TABLET | Refills: 2 | Status: SHIPPED | OUTPATIENT
Start: 2022-07-27 | End: 2022-08-26

## 2022-07-27 NOTE — ADDENDUM NOTE
Addended by: Leidy Huffman on: 7/27/2022 08:30 AM     Modules accepted: Orders pt awake alert active color pink.  able to use both arms with equal strength.  does not appear to be in pain at this time. after discussion with MD discharged home.

## 2022-07-28 NOTE — TELEPHONE ENCOUNTER
Spoke with patient and advised to start Prednisone 15 mg now     Confirmed 8/9/22 at 11 am appt with Dr Genesis Araujo

## 2022-08-09 ENCOUNTER — OFFICE VISIT (OUTPATIENT)
Dept: NEUROLOGY | Facility: CLINIC | Age: 32
End: 2022-08-09
Payer: COMMERCIAL

## 2022-08-09 VITALS
SYSTOLIC BLOOD PRESSURE: 116 MMHG | HEIGHT: 65 IN | DIASTOLIC BLOOD PRESSURE: 59 MMHG | HEART RATE: 73 BPM | BODY MASS INDEX: 24.21 KG/M2 | WEIGHT: 145.3 LBS

## 2022-08-09 DIAGNOSIS — M33.90 DERMATOMYOSITIS (HCC): Primary | ICD-10-CM

## 2022-08-09 PROCEDURE — 99214 OFFICE O/P EST MOD 30 MIN: CPT | Performed by: PSYCHIATRY & NEUROLOGY

## 2022-08-09 NOTE — PROGRESS NOTES
Patient ID: Reilly Guerra is a 27 y o  female  Assessment/Plan:  Dermatomyositis [facility]  Assessment:  Mrs Manuel Webb presents to the clinic for a 6 month follow up foir continued care for dermatomyositis  She is currently well controlled on MTX and currently undergoing a Prednisone taper  She describes one 'flair' in June, otherwise in good health and no current complaints  We noticed that her MCV was elevated and MCHC was decreased therefore B12 and B9 levels will be ordered  Plan:  - Stay on Prednisone 10mg PO QD for two more weeks then we can titrate to 5mg PO QD  - Get B12 and B9 levels  - Continue with frequent checking of CBC, CMP, ESR, CK  - Follow up in 6 months     Diagnoses and all orders for this visit:    Dermatomyositis ([facility] )  -     Vitamin B12; Future  -     Folate; Future         Subjective:  Since her last visit she reports that she has been feeling well withy one exception In June she started to have fatigue and joint pain that lasted a few weeks  She reports that she did not seek medica help and that she waited it out and currently reports feeling much better  She states mc tshe does get rashes in the sun over the whole body  Denies new  - Surgeries  - hospitalizations   - injuries/falls  - exposures  - illnesses  - changes at home, partner in good health    - Sleeps well, balanced diet, exercising well every day, feels like she has her stressors under control and meditates and lowered caffeine intake      Currently still taking  - Methotrexate 2 5mg Po QID  - Prednisone 10mg PO QD  - Folate/biotin, Ca    - Discussed labs with patient  - Goal for today: taper the prednisone down further    The following portions of the patient's history were reviewed and updated as appropriate: allergies, current medications, past family history, past medical history, past social history, past surgical history and problem list     Objective:  Blood pressure 116/59, pulse 73, height 5' 4 5" (4 823 m), weight 65 9 kg (145 lb 4 8 oz), currently breastfeeding  Physical Exam  Vitals and nursing note reviewed  Constitutional:       Appearance: Normal appearance  HENT:      Head: Normocephalic and atraumatic  Nose: Nose normal       Mouth/Throat:      Mouth: Mucous membranes are moist       Pharynx: Oropharynx is clear  Eyes:      General: Lids are normal       Extraocular Movements: Extraocular movements intact  Pupils: Pupils are equal, round, and reactive to light  Cardiovascular:      Rate and Rhythm: Normal rate  Pulses: Normal pulses  Pulmonary:      Effort: Pulmonary effort is normal    Musculoskeletal:         General: Normal range of motion  Cervical back: Normal range of motion  Skin:     General: Skin is warm  Capillary Refill: Capillary refill takes less than 2 seconds  Neurological:      General: No focal deficit present  Mental Status: She is alert and oriented to person, place, and time  Coordination: Coordination is intact  Deep Tendon Reflexes: Strength normal and reflexes are normal and symmetric  Psychiatric:         Mood and Affect: Mood normal          Speech: Speech normal          Neurological Exam  Mental Status  Alert  Oriented to person, place, time and situation  Oriented to person, place, and time  Speech is normal  Language is fluent with no aphasia  Attention and concentration are normal     Cranial Nerves  CN II: Visual acuity is normal  Visual fields full to confrontation  CN III, IV, VI: Extraocular movements intact bilaterally  Normal lids and orbits bilaterally  Pupils equal round and reactive to light bilaterally  CN V: Facial sensation is normal   CN VII: Full and symmetric facial movement  CN VIII: Hearing is normal   CN IX, X: Palate elevates symmetrically  Normal gag reflex  CN XI: Shoulder shrug strength is normal   CN XII: Tongue midline without atrophy or fasciculations  Motor  Normal muscle bulk throughout  No fasciculations present  Normal muscle tone  No abnormal involuntary movements  Strength is 5/5 throughout all four extremities  Sensory  Sensation is intact to light touch, pinprick, vibration and proprioception in all four extremities  Reflexes  Deep tendon reflexes are 2+ and symmetric in all four extremities  Coordination    Finger-to-nose, rapid alternating movements and heel-to-shin normal bilaterally without dysmetria  Gait  Normal casual, toe, heel and tandem gait  ROS:  Review of Systems   Constitutional: Negative  Negative for appetite change and fever  HENT: Negative  Negative for hearing loss, tinnitus, trouble swallowing and voice change  Eyes: Negative  Negative for photophobia and pain  Respiratory: Negative  Negative for shortness of breath  Cardiovascular: Negative  Negative for palpitations  Gastrointestinal: Negative  Negative for nausea and vomiting  Endocrine: Negative  Negative for cold intolerance  Genitourinary: Negative  Negative for dysuria, frequency and urgency  Musculoskeletal: Negative  Negative for myalgias and neck pain  Skin: Negative  Negative for rash  Neurological: Negative  Negative for dizziness, tremors, seizures, syncope, facial asymmetry, speech difficulty, weakness, light-headedness, numbness and headaches  Hematological: Negative  Does not bruise/bleed easily  Psychiatric/Behavioral: Negative  Negative for confusion, hallucinations and sleep disturbance       ROS by MA reviewed; agree

## 2022-08-09 NOTE — ASSESSMENT & PLAN NOTE
Assessment:  Mrs Tyron Boxer presents to the clinic for a 6 month follow up foir continued care for dermatomyositis  She is currently well controlled on MTX and currently undergoing a Prednisone taper  She describes one 'flair' in June, otherwise in good health and no current complaints  We noticed that her MCV was elevated and MCHC was decreased therefore B12 and B9 levels will be ordered      Plan:  - Stay on Prednisone 10mg PO QD for two more weeks then we can titrate to 5mg PO QD  - Get B12 and B9 levels  - Continue with frequent checking of CBC, CMP, ESR, CK  - Follow up in 6 months

## 2022-08-22 DIAGNOSIS — M33.90 DERMATOMYOSITIS (HCC): ICD-10-CM

## 2022-09-09 ENCOUNTER — APPOINTMENT (OUTPATIENT)
Dept: LAB | Facility: HOSPITAL | Age: 32
End: 2022-09-09
Payer: COMMERCIAL

## 2022-09-09 DIAGNOSIS — M33.90 DERMATOMYOSITIS (HCC): ICD-10-CM

## 2022-09-09 LAB
FOLATE SERPL-MCNC: >20 NG/ML (ref 3.1–17.5)
VIT B12 SERPL-MCNC: 427 PG/ML (ref 100–900)

## 2022-09-09 PROCEDURE — 82607 VITAMIN B-12: CPT

## 2022-09-09 PROCEDURE — 82746 ASSAY OF FOLIC ACID SERUM: CPT

## 2022-09-21 ENCOUNTER — TELEPHONE (OUTPATIENT)
Dept: OBGYN CLINIC | Facility: CLINIC | Age: 32
End: 2022-09-21

## 2022-09-21 NOTE — TELEPHONE ENCOUNTER
Pt noticed that her vulva is itchy  Her  said that it is red and feels warmer  She does have a skin condition called dermatitis that causes itchy skin and rashes   She would like to know if that can be the cause of this    Please advise

## 2022-09-22 DIAGNOSIS — M33.90 DERMATOMYOSITIS (HCC): ICD-10-CM

## 2022-10-21 DIAGNOSIS — M33.90 DERMATOMYOSITIS (HCC): ICD-10-CM

## 2022-10-24 DIAGNOSIS — M33.90 DERMATOMYOSITIS (HCC): ICD-10-CM

## 2022-11-23 DIAGNOSIS — M33.90 DERMATOMYOSITIS (HCC): ICD-10-CM

## 2023-01-01 DIAGNOSIS — M33.90 DERMATOMYOSITIS (HCC): ICD-10-CM

## 2023-01-25 DIAGNOSIS — M33.90 DERMATOMYOSITIS (HCC): ICD-10-CM

## 2023-02-07 ENCOUNTER — APPOINTMENT (OUTPATIENT)
Dept: LAB | Facility: HOSPITAL | Age: 33
End: 2023-02-07

## 2023-02-09 ENCOUNTER — OFFICE VISIT (OUTPATIENT)
Dept: NEUROLOGY | Facility: CLINIC | Age: 33
End: 2023-02-09

## 2023-02-09 VITALS
HEIGHT: 65 IN | DIASTOLIC BLOOD PRESSURE: 69 MMHG | BODY MASS INDEX: 24.37 KG/M2 | TEMPERATURE: 97.5 F | SYSTOLIC BLOOD PRESSURE: 115 MMHG | HEART RATE: 61 BPM | WEIGHT: 146.3 LBS

## 2023-02-09 DIAGNOSIS — Z79.61 LONG TERM (CURRENT) USE OF IMMUNOMODULATOR: Primary | ICD-10-CM

## 2023-02-09 DIAGNOSIS — M33.90 DERMATOMYOSITIS (HCC): ICD-10-CM

## 2023-02-09 NOTE — ASSESSMENT & PLAN NOTE
26-year-old woman with dermatomyositis, 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 here as a follow up for her dermatomyositis  No new flare up, some difficulty swallowing, pt to start back on methotrexate, training for half marathon  No weakness noted on exam      Prior meds- prednisone- discontinued Sep 2022   Current meds- Methotrexate 10 mg weekly, started Feb 2022, she is not taking for last 4 weeks as she accidentally threw them with other groceries       Plan-  Continue Methotrexate 10 mg weekly   Continue Folic acid 1 mg daily   Follow up with PCO regarding mild increase in total bilirubin   Continue with frequent checking of CBC, CMP, ESR, CK  Follow up in 6 months

## 2023-02-09 NOTE — PROGRESS NOTES
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 here as a follow up for her dermatomyositis  No new flare up, some difficulty swallowing, pt to start back on methotrexate, training for half marathon  No weakness noted on exam      Prior meds- prednisone- discontinued Sep 2022   Current meds- Methotrexate 10 mg weekly, started Feb 2022, she is not taking for last 4 weeks as she accidentally threw them with other groceries  Plan-  Continue Methotrexate 10 mg weekly   Continue Folic acid 1 mg daily   Follow up with PCO regarding mild increase in total bilirubin   Continue with frequent checking of CBC, CMP, ESR, CK  Follow up in 6 months         Diagnoses and all orders for this visit:    Long term (current) use of immunomodulator  -     CBC and differential; Standing  -     Comprehensive metabolic panel; Standing  -     Sedimentation rate, automated; Standing  -     CK (with reflex to MB); Standing  -     CBC and differential  -     Comprehensive metabolic panel  -     Sedimentation rate, automated  -     CK (with reflex to MB)    Dermatomyositis (HCC)  -     methotrexate 2 5 mg tablet; Take 4 tablets (10 mg total) by mouth once a week  -     CBC and differential; Standing  -     Comprehensive metabolic panel; Standing  -     Sedimentation rate, automated; Standing  -     CK (with reflex to MB); Standing  -     CBC and differential  -     Comprehensive metabolic panel  -     Sedimentation rate, automated  -     CK (with reflex to MB)           Subjective:  49-year-old woman with dermatomyositis, 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 here as a follow up for her dermatomyositis  For review, Started having rash in the hands (around the knuckles) in Jan, was seen by PCP, was given steroid course for 10 days   Steroids did help the rash a little bit, after she finished steroids she started having joint pains, generalized fatigue, muscle weakness (picking up heavy objects)  Had to stop her regular exercise regimen  Was having some swallowing issues, was initially seen by ENT, and then by Dr Sabina Jacobo in Immunology  Was diagnosed with dermatomyositis, started on steroids in April  Never had a biopsy  Since last visit, She reports dry and itchy skin in hje rknucles, left rib cage and elbows ongoing since June  Use eczema lotion  Join pain left hip- began 6-8 weeks  Training for half marathon- started training in September     Swallowing is difficult- dry food- pretzels, bread, feel like it gets stuck,   Slightly getting worse few weeks ago  She says she stopped medication 4 weeks ago as she accidentally threw it and she did get refil for this Monday however has not taken as she wanted to check her blood work first  No side effects  She stopped taking Steroid Sep 2022     Sleeping less, picked up a second job, ne=ot seeing kids that much  Denies new  - Surgeries  - hospitalizations   - injuries/falls  - exposures  - illnesses  - changes at home, partner in good health    Work up-   EMG July 2021, performed by Dr Warren Hayden:  Abnormal study, consistent with a myopathic process affecting the proximal muscles without any evidence of membrane instability  In addition, there was evidence to suggest a moderate carpal tunnel across the right wrist   And a possible ulnar neuropathy across the elbow      Patient has been followed by Rheumatology for dermatomyositis, has been on steroids since April, started on Prednisone 80 mg a day x 8 days, then 60 mg a day x 2 weeks,  40 mg a day x 2 weeks, 30 mg a day x 2 weeks, gradually tapering off, currtently on 5mg every other day       Labs:  June 2021:  QuantiFERON indeterminate  Hepatitis-B, C were negative, HIV negative  CK normal 49, 55    Striated muscle antibody, voltage gated calcium channel antibody, musk, acetyl choline receptor binding, blocking, and modulating antibodies were negative  Myositis panel was negative from [facility]  March 2021 TSH was negative, JUAN, rheumatoid factor were negative, CRP was normal, Lyme was negative  Vitamin-D level was 21 9     MRI brain June 2021 with and without contrast normal study, images personally reviewed by me as a part of this evaluation      She works as an  in an [address]      She is not a smoker  No alcohol  Has 2 girls (8 year and 3 years)      Family hx: Adopted, does not know much abt her family hx  The following portions of the patient's history were reviewed and updated as appropriate: allergies, current medications, past family history, past medical history, past social history, past surgical history and problem list          Objective:    Blood pressure 115/69, pulse 61, temperature 97 5 °F (36 4 °C), temperature source Temporal, height 5' 4 5" (1 638 m), weight 66 4 kg (146 lb 4 8 oz), currently breastfeeding  Physical Exam  Vitals reviewed  Constitutional:       Appearance: Normal appearance  HENT:      Head: Normocephalic  Mouth/Throat:      Mouth: Mucous membranes are moist       Pharynx: Oropharynx is clear  Eyes:      Extraocular Movements: Extraocular movements intact  Pupils: Pupils are equal, round, and reactive to light  Cardiovascular:      Rate and Rhythm: Normal rate  Pulses: Normal pulses  Pulmonary:      Effort: Pulmonary effort is normal    Abdominal:      Palpations: Abdomen is soft  Musculoskeletal:         General: Normal range of motion  Cervical back: Normal range of motion  Skin:     General: Skin is warm  Capillary Refill: Capillary refill takes less than 2 seconds  Findings: Rash (subtle rash on her face, improving with time ) present  Neurological:      Mental Status: She is alert  Psychiatric:         Mood and Affect: Mood normal          Neurological Examination:     Mental Status:  The patient was awake, alert, attentive, oriented to person, place, and time  No dysarthria or aphasia noted  Cranial Nerves:   I: smell Not tested   II: visual fields Full to confrontation  Pupils equal, round, reactive to light with normal accomodation  Fundus: benign fundus  III,IV,VI: extraocular muscles EOMI, no nystagmus   V: masseter and pterygoid strength full  Sensation in the V1 through V3 distributions intact to pinprick and light touch bilaterally  VII: Face is symmetric with no weakness noted  VIII: Audition intact to finger rub bilaterally  IX/X: Uvula midline  Soft palate elevation symmetric  XI: Trapezius and SCM strength 5/5 B/L  XII: Tongue midline with no atrophy or fasciculations with appropriate movement  Motor Examination:   No pronator drift  Bulk: Normal  No atrophy Tone: Normal  Fasciculations: None  Deltoid Biceps Triceps WE   WF   FF IO     Right        5         5          5         5      5      5   5        Left           5        5          5          5      5     5   5                       IP        Quad   Ham     TA       Gastroc   Right      5            5          5         5                5  Left         5            5         5         5                5       Reflexes:                   Biceps Brachioradialis Triceps Patella Achilles Plantars   Right          2+            2+                  2+        2+       2+         Down   Left            2+             2+                 2+         2+       2+         Down     Clonus: None    Coordination: Patient able to perform normal finger-to-nose and heel to shin appropriately  Normal rapid alternating movements  Sensory: Normal sensation to light touch, pin prick and vibratory sensation throughout  Gait:normal stance and posture, normal stride length and arm swing, normal turn around  Romberg negative  ROS:    Review of Systems   Constitutional: Negative  Negative for appetite change and fever  HENT: Negative  Negative for hearing loss, tinnitus, trouble swallowing and voice change  Eyes: Negative  Negative for photophobia, pain and visual disturbance  Respiratory: Negative  Negative for shortness of breath  Cardiovascular: Negative  Negative for palpitations  Gastrointestinal: Negative  Negative for nausea and vomiting  Endocrine: Negative  Negative for cold intolerance  Genitourinary: Negative  Negative for dysuria, frequency and urgency  Musculoskeletal: Negative  Negative for gait problem, myalgias and neck pain  Skin: Negative  Negative for rash  Allergic/Immunologic: Negative  Neurological: Negative  Negative for dizziness, tremors, seizures, syncope, facial asymmetry, speech difficulty, weakness, light-headedness, numbness and headaches  Hematological: Negative  Does not bruise/bleed easily  Psychiatric/Behavioral: Negative  Negative for confusion, hallucinations and sleep disturbance

## 2023-03-24 ENCOUNTER — ANNUAL EXAM (OUTPATIENT)
Dept: OBGYN CLINIC | Facility: CLINIC | Age: 33
End: 2023-03-24

## 2023-03-24 VITALS
HEIGHT: 65 IN | BODY MASS INDEX: 25.06 KG/M2 | SYSTOLIC BLOOD PRESSURE: 106 MMHG | WEIGHT: 150.4 LBS | DIASTOLIC BLOOD PRESSURE: 74 MMHG

## 2023-03-24 DIAGNOSIS — Z01.419 ROUTINE GYNECOLOGICAL EXAMINATION: ICD-10-CM

## 2023-03-24 DIAGNOSIS — Z01.419 ENCNTR FOR GYN EXAM (GENERAL) (ROUTINE) W/O ABN FINDINGS: Primary | ICD-10-CM
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
## 2023-04-04 LAB
LAB AP GYN PRIMARY INTERPRETATION: NORMAL
Lab: NORMAL

## 2023-04-07 ENCOUNTER — TELEPHONE (OUTPATIENT)
Dept: OBGYN CLINIC | Facility: CLINIC | Age: 33
End: 2023-04-07

## 2023-04-07 NOTE — TELEPHONE ENCOUNTER
----- Message from Cleo Reyes MD sent at 4/7/2023 11:49 AM EDT -----  Please let Farhana Lot know that her pap and HPV are normal

## 2023-05-23 DIAGNOSIS — M33.90 DERMATOMYOSITIS (HCC): Primary | ICD-10-CM

## 2023-05-23 NOTE — TELEPHONE ENCOUNTER
----- Message from Dominique Spann RN sent at 5/23/2023 11:00 AM EDT -----  Regarding: FW: Refill  Contact: [phone]    ----- Message -----  From: Carl Velez  Sent: 5/23/2023  10:54 AM EDT  To: Neurology [address] Clinical Team 5  Subject: Refill                                           Yes it is a request for methotrexate  Would it be possible to have extra refills? Previous bottles have stayed “zero refills” and it would be helpful if I could just contact my pharmacy when I need my new RX instead of going through [address]  This is a maintenance medication so I will be using it continuously   Thanks

## 2023-05-23 NOTE — TELEPHONE ENCOUNTER
Called pharmacy, their warehouse does not carry 10 mg tabs   They also said pricing does increase significantly     Please send new script for 2 5 mg to pharmacy with refills

## 2023-05-23 NOTE — TELEPHONE ENCOUNTER
Dr Vaishali Dobbs - would you like to change the prescription to 10 mg tabs instead?      If so, please send an updated script with refills     Please let us know if you send 10 mg tabs instead so we can make sure the patient knows to only take 1 tab instead of 4     Thank you

## 2023-08-04 ENCOUNTER — APPOINTMENT (OUTPATIENT)
Dept: LAB | Facility: HOSPITAL | Age: 33
End: 2023-08-04
Payer: COMMERCIAL

## 2023-08-04 LAB
ALBUMIN SERPL BCP-MCNC: 4.7 G/DL (ref 3.5–5)
ALP SERPL-CCNC: 34 U/L (ref 34–104)
ALT SERPL W P-5'-P-CCNC: 9 U/L (ref 7–52)
ANION GAP SERPL CALCULATED.3IONS-SCNC: 4 MMOL/L
AST SERPL W P-5'-P-CCNC: 16 U/L (ref 13–39)
BASOPHILS # BLD AUTO: 0.01 THOUSANDS/ÂΜL (ref 0–0.1)
BASOPHILS NFR BLD AUTO: 0 % (ref 0–1)
BILIRUB SERPL-MCNC: 1.99 MG/DL (ref 0.2–1)
BUN SERPL-MCNC: 13 MG/DL (ref 5–25)
CALCIUM SERPL-MCNC: 9.6 MG/DL (ref 8.4–10.2)
CHLORIDE SERPL-SCNC: 106 MMOL/L (ref 96–108)
CK SERPL-CCNC: 97 U/L (ref 26–192)
CO2 SERPL-SCNC: 29 MMOL/L (ref 21–32)
CREAT SERPL-MCNC: 0.87 MG/DL (ref 0.6–1.3)
EOSINOPHIL # BLD AUTO: 0.15 THOUSAND/ÂΜL (ref 0–0.61)
EOSINOPHIL NFR BLD AUTO: 5 % (ref 0–6)
ERYTHROCYTE [DISTWIDTH] IN BLOOD BY AUTOMATED COUNT: 12.4 % (ref 11.6–15.1)
ERYTHROCYTE [SEDIMENTATION RATE] IN BLOOD: 1 MM/HOUR (ref 0–19)
GFR SERPL CREATININE-BSD FRML MDRD: 86 ML/MIN/1.73SQ M
GLUCOSE P FAST SERPL-MCNC: 85 MG/DL (ref 65–99)
HCT VFR BLD AUTO: 42.9 % (ref 34.8–46.1)
HGB BLD-MCNC: 13.7 G/DL (ref 11.5–15.4)
IMM GRANULOCYTES # BLD AUTO: 0.01 THOUSAND/UL (ref 0–0.2)
IMM GRANULOCYTES NFR BLD AUTO: 0 % (ref 0–2)
LYMPHOCYTES # BLD AUTO: 1.05 THOUSANDS/ÂΜL (ref 0.6–4.47)
LYMPHOCYTES NFR BLD AUTO: 38 % (ref 14–44)
MCH RBC QN AUTO: 31 PG (ref 26.8–34.3)
MCHC RBC AUTO-ENTMCNC: 31.9 G/DL (ref 31.4–37.4)
MCV RBC AUTO: 97 FL (ref 82–98)
MONOCYTES # BLD AUTO: 0.29 THOUSAND/ÂΜL (ref 0.17–1.22)
MONOCYTES NFR BLD AUTO: 11 % (ref 4–12)
NEUTROPHILS # BLD AUTO: 1.26 THOUSANDS/ÂΜL (ref 1.85–7.62)
NEUTS SEG NFR BLD AUTO: 46 % (ref 43–75)
NRBC BLD AUTO-RTO: 0 /100 WBCS
PLATELET # BLD AUTO: 276 THOUSANDS/UL (ref 149–390)
PMV BLD AUTO: 9.8 FL (ref 8.9–12.7)
POTASSIUM SERPL-SCNC: 4.3 MMOL/L (ref 3.5–5.3)
PROT SERPL-MCNC: 7.2 G/DL (ref 6.4–8.4)
RBC # BLD AUTO: 4.42 MILLION/UL (ref 3.81–5.12)
SODIUM SERPL-SCNC: 139 MMOL/L (ref 135–147)
WBC # BLD AUTO: 2.77 THOUSAND/UL (ref 4.31–10.16)

## 2023-10-23 ENCOUNTER — TELEPHONE (OUTPATIENT)
Dept: NEUROLOGY | Facility: CLINIC | Age: 33
End: 2023-10-23

## 2023-10-23 NOTE — TELEPHONE ENCOUNTER
Pt called to cancel her 11/7 appt with Dr. Skyler Santoro due to not having insurance currently. She will have converge starting 1/1/24. Can you please assist with r/s the f/u appt?    Thank you

## 2023-10-25 ENCOUNTER — TELEPHONE (OUTPATIENT)
Dept: NEUROLOGY | Facility: CLINIC | Age: 33
End: 2023-10-25

## 2023-10-25 DIAGNOSIS — M33.90 DERMATOMYOSITIS (HCC): ICD-10-CM

## 2023-10-25 NOTE — TELEPHONE ENCOUNTER
Spoke to pt to r/s appt. Pt is scheduled on 2/20/24 w/ Dr. Josephine Barrera in [facility]. Pt requested refill on methotrexate, explained to pt Dr. Josephine Barrera is not in office today and will let her know tomorrow when she is in to refill medication.

## 2023-10-26 ENCOUNTER — TELEPHONE (OUTPATIENT)
Dept: NEUROLOGY | Facility: CLINIC | Age: 33
End: 2023-10-26

## 2023-10-26 NOTE — TELEPHONE ENCOUNTER
----- Message from Emilia Walker sent at 10/25/2023  8:13 AM EDT -----  Regarding: Medication Refill  Pt is requesting a refill on methotrexate and is scheduled to see you in February.

## 2024-03-01 ENCOUNTER — TELEPHONE (OUTPATIENT)
Dept: NEUROLOGY | Facility: CLINIC | Age: 34
End: 2024-03-01

## 2024-03-01 NOTE — TELEPHONE ENCOUNTER
LMOM to confirm pt's appt w/ Dr. Souza in [facility] on 3/12/24 at 10. Advised pt to arrive 10 minutes prior to check in with the .

## 2024-03-12 ENCOUNTER — OFFICE VISIT (OUTPATIENT)
Dept: NEUROLOGY | Facility: CLINIC | Age: 34
End: 2024-03-12
Payer: COMMERCIAL

## 2024-03-12 VITALS
HEART RATE: 71 BPM | OXYGEN SATURATION: 100 % | WEIGHT: 154.6 LBS | TEMPERATURE: 98.2 F | BODY MASS INDEX: 25.73 KG/M2 | DIASTOLIC BLOOD PRESSURE: 74 MMHG | SYSTOLIC BLOOD PRESSURE: 108 MMHG

## 2024-03-12 DIAGNOSIS — M33.90 DERMATOMYOSITIS (HCC): Primary | ICD-10-CM

## 2024-03-12 DIAGNOSIS — T17.308A CHOKING: ICD-10-CM

## 2024-03-12 DIAGNOSIS — Z79.61 LONG TERM (CURRENT) USE OF IMMUNOMODULATOR: ICD-10-CM

## 2024-03-12 PROCEDURE — 99214 OFFICE O/P EST MOD 30 MIN: CPT | Performed by: PSYCHIATRY & NEUROLOGY

## 2024-03-12 RX ORDER — METHOTREXATE 2.5 MG/1
15 TABLET ORAL WEEKLY
Qty: 24 TABLET | Refills: 3 | Status: SHIPPED | OUTPATIENT
Start: 2024-03-12

## 2024-03-12 NOTE — PROGRESS NOTES
Patient ID: Ирина Rider is a 33 y.o. female.    Assessment/Plan:  Ирина Rider is a 33 y.o. female with a PMHx of dermatomyositis who presents today for follow-up. The pt was last seen February 2023 since the last visit the patient has had worsening of her symptoms including weakness, shortness of breath with exertion, and swallowing difficulties.  Patient had a brief episode off of methotrexate, and has been back on methotrexate 10 mg weekly for the past number of months and despite this is still having symptoms.  Examination today revealed a worsening examination specifically with her strength.    Plan  - Increase methotrexate to 15mg weekly, and is to call us in a month to let us know how she is doing  - Offered steroid course and pt declined at this time, will consider it if methotrexate increase doesn't help. Pt is to let us know in a month or so  - Pt is to continue folate supplementation  - Blood work: CK, CBC, CMP, ESR (q3 months) - asked pt to do this prior to increasing dose of methotrexate and so to do it today or tomorrow ideally.  - Speech therapy for swallowing evaluation  - Discussed the current impressions, and provided patient education  - Discussed proper use, possible side effects and risks of treatments  - Discussed instructions for management, importance of tx compliance, and risk factor reduction  - All questions were address and patient/guardian verbalized understanding  - Pt advised to call the office with any questions of concerns  - Pt is to follow up with us in 3-4 months     Diagnoses and all orders for this visit:    Dermatomyositis (HCC)  -     Creatine Kinase, Total; Standing  -     CBC (Includes Diff/Plt) (Refl); Standing  -     Comprehensive metabolic panel; Standing  -     Sedimentation rate, automated; Standing  -     Comprehensive metabolic panel  -     Sedimentation rate, automated    Long term (current) use of immunomodulator  -     Creatine Kinase, Total;  "Standing  -     CBC (Includes Diff/Plt) (Refl); Standing  -     Comprehensive metabolic panel; Standing  -     Sedimentation rate, automated; Standing  -     Comprehensive metabolic panel  -     Sedimentation rate, automated           Subjective:  Ирина Rider is a 33 y.o. female with a PMHx of dermatomyositis who presents today for follow-up. The pt was last seen February 2023. Since the last visit they report increased weakness (reports that her \"arms feel heaver then they should be\", walking up the stairs is \"hard\"). Looses breath walking up the stairs or playing with her kids (10 and 4 yo), works PT as a  and the quick pace environment can become \"a lot\" and she becomes symptomatic. She works full time as an . Work outs have become harder, breathing is more labored, fatigued, muscle weakness. Reports periodic swallowing issues that occur a couple of times a week, she had choking episodes particularly with \"dry\" foods such a bread and she reports that she can't swallow them without water. No issues with swallowing liquids. She has not episodes of choking requiring intervention. No voice changes or speech difficulties. No rashes currently but will get them in the summer with sun exposure. No weight loss (weight gain). Lost insurance and was off methotrexate for a month around September 2023 and then when she restarted it she started on 10mg and she developed headache nausea and lightheadedness then the week after that decreased to 5mg and did that for 3 weeks and then increased back to 10mg. Took about a month to stabilize on the medication.    To review from prior documentation:  For review, Started having rash in the hands (around the knuckles) in Jan, was seen by PCP, was given steroid course for 10 days. Steroids did help the rash a little bit, after she finished steroids she started having joint pains, generalized fatigue, muscle weakness (picking up heavy objects). Had to stop " her regular exercise regimen.   Was having some swallowing issues, was initially seen by ENT, and then by Dr. Escalante in Immunology. Was diagnosed with dermatomyositis, started on steroids in April. Never had a biopsy.      Since last visit, She reports dry and itchy skin in hje rknucles, left rib cage and elbows ongoing since June. Use eczema lotion.   Join pain left hip- began 6-8 weeks.   Training for half marathon- started training in September      Swallowing is difficult- dry food- pretzels, bread, feel like it gets stuck,   Slightly getting worse few weeks ago.      She says she stopped medication 4 weeks ago as she accidentally threw it and she did get refil for this Monday however has not taken as she wanted to check her blood work first. No side effects.   She stopped taking Steroid Sep 2022      Sleeping less, picked up a second job, ne=ot seeing kids that much.      Denies new  - Surgeries  - hospitalizations   - injuries/falls  - exposures  - illnesses  - changes at home, partner in good health     Work up-   EMG July 2021, performed by Dr. Moore:  Abnormal study, consistent with a myopathic process affecting the proximal muscles without any evidence of membrane instability.  In addition, there was evidence to suggest a moderate carpal tunnel across the right wrist.  And a possible ulnar neuropathy across the elbow.     Patient has been followed by Rheumatology for dermatomyositis, has been on steroids since April, started on Prednisone 80 mg a day x 8 days, then 60 mg a day x 2 weeks,  40 mg a day x 2 weeks, 30 mg a day x 2 weeks, gradually tapering off, currtently on 5mg every other day.      Labs:  June 2021:  QuantiFERON indeterminate  Hepatitis-B, C were negative, HIV negative  CK normal 49, 55.  Striated muscle antibody, voltage gated calcium channel antibody, musk, acetyl choline receptor binding, blocking, and modulating antibodies were negative.    Myositis panel was negative from [place].  March  2021 TSH was negative, JUAN, rheumatoid factor were negative, CRP was normal, Lyme was negative.  Vitamin-D level was 21.9     MRI brain June 2021 with and without contrast normal study, images personally reviewed by me as a part of this evaluation.     She works as an  in an electrical company.     She is not a smoker. No alcohol. Has 2 girls (8 year and 3 years).     Family hx: Adopted, does not know much abt her family hx.     The following portions of the patient's history were reviewed and updated as appropriate: allergies, current medications, past family history, past medical history, past social history, past surgical history, and problem list.     Objective:    Blood pressure 108/74, pulse 71, temperature 98.2 °F (36.8 °C), temperature source Temporal, weight 70.1 kg (154 lb 9.6 oz), SpO2 100%, not currently breastfeeding.    Physical Exam  Vitals and nursing note reviewed.  Constitutional: Alert. Not in acute distress. Not ill-appearing, toxic-appearing or diaphoretic.    HENT: Normocephalic and atraumatic. Nose and Ears normal.    Eyes: No scleral icterus. No discharge.    Neck: Neck Supple. ROM normal.   Cardiovascular: Distal extremities warm without palpable edema or tenderness, no observed significant swelling.    Pulmonary:  Pulmonary effort is normal. Not in respiratory distress   Abdominal: Abdomen is flat and not distended   Musculoskeletal: No swelling or deformity.   Skin: Warm and dry. Mild rash on knuckles   Psychiatric:  Normal behavior and appropriate affect      Neurological Exam  Mental Status  Awake and alert. Recent and remote memory are intact. Speech is normal. Language is fluent with no aphasia. Attention and concentration are normal.    Cranial Nerves  CN II: Visual fields full to confrontation.  CN III, IV, VI: Extraocular movements intact bilaterally. Normal lids and orbits bilaterally. Pupils equal round and reactive to light bilaterally.  CN V: Facial sensation is  normal.  CN VII: Full and symmetric facial movement.  CN VIII: Hearing is normal.  CN IX, X: Palate elevates symmetrically  CN XI: Shoulder shrug strength is normal.  CN XII: Tongue midline without atrophy or fasciculations.    Motor  Normal muscle bulk throughout. No fasciculations present. Normal muscle tone. No abnormal involuntary movements.    Deltoids 4+  Biceps 5  Triceps 4+  Interossei 5  Wrists 5   Quads 4  Hamstring 4+  Dorsiflexion 5  Plantar 5    Sensory  Light touch is normal in upper and lower extremities.     Reflexes  Deep tendon reflexes are 2+ and symmetric except as noted.    Coordination  Right: Finger-to-nose normal.Left: Finger-to-nose normal.    Gait  Casual gait is normal including stance, stride, and arm swing.        ROS:    Review of Systems   Constitutional:  Negative for appetite change, fatigue and fever.   HENT:  Positive for trouble swallowing. Negative for hearing loss, tinnitus and voice change.    Eyes: Negative.  Negative for photophobia, pain and visual disturbance.   Respiratory:  Positive for shortness of breath.    Cardiovascular: Negative.  Negative for palpitations.   Gastrointestinal: Negative.  Negative for nausea and vomiting.   Endocrine: Negative.  Negative for cold intolerance.   Genitourinary: Negative.  Negative for dysuria, frequency and urgency.   Musculoskeletal:  Positive for myalgias (generalized muscle weakness). Negative for back pain, gait problem, neck pain and neck stiffness.   Skin: Negative.  Negative for rash.   Allergic/Immunologic: Negative.    Neurological:  Positive for weakness. Negative for dizziness, tremors, seizures, syncope, facial asymmetry, speech difficulty, light-headedness, numbness and headaches.   Hematological: Negative.  Does not bruise/bleed easily.   Psychiatric/Behavioral: Negative.  Negative for confusion, hallucinations and sleep disturbance.

## 2024-03-22 LAB
ALBUMIN SERPL-MCNC: 4.5 G/DL (ref 3.9–4.9)
ALBUMIN/GLOB SERPL: 2.1 {RATIO} (ref 1.2–2.2)
ALP SERPL-CCNC: 37 IU/L (ref 44–121)
ALT SERPL-CCNC: 9 IU/L (ref 0–32)
AST SERPL-CCNC: 19 IU/L (ref 0–40)
BILIRUB SERPL-MCNC: 1.7 MG/DL (ref 0–1.2)
BUN SERPL-MCNC: 12 MG/DL (ref 6–20)
BUN/CREAT SERPL: 14 (ref 9–23)
CALCIUM SERPL-MCNC: 9.2 MG/DL (ref 8.7–10.2)
CHLORIDE SERPL-SCNC: 102 MMOL/L (ref 96–106)
CO2 SERPL-SCNC: 26 MMOL/L (ref 20–29)
CREAT SERPL-MCNC: 0.84 MG/DL (ref 0.57–1)
EGFR: 92 ML/MIN/1.73
ERYTHROCYTE [SEDIMENTATION RATE] IN BLOOD BY WESTERGREN METHOD: 2 MM/HR (ref 0–32)
GLOBULIN SER-MCNC: 2.1 G/DL (ref 1.5–4.5)
GLUCOSE SERPL-MCNC: 81 MG/DL (ref 70–99)
POTASSIUM SERPL-SCNC: 4.3 MMOL/L (ref 3.5–5.2)
PROT SERPL-MCNC: 6.6 G/DL (ref 6–8.5)
SODIUM SERPL-SCNC: 139 MMOL/L (ref 134–144)

## 2024-08-01 ENCOUNTER — OFFICE VISIT (OUTPATIENT)
Dept: NEUROLOGY | Facility: CLINIC | Age: 34
End: 2024-08-01
Payer: COMMERCIAL

## 2024-08-01 VITALS
HEART RATE: 62 BPM | TEMPERATURE: 98.2 F | BODY MASS INDEX: 25.66 KG/M2 | DIASTOLIC BLOOD PRESSURE: 72 MMHG | OXYGEN SATURATION: 96 % | WEIGHT: 154 LBS | HEIGHT: 65 IN | SYSTOLIC BLOOD PRESSURE: 136 MMHG

## 2024-08-01 DIAGNOSIS — Z79.61 LONG TERM (CURRENT) USE OF IMMUNOMODULATOR: ICD-10-CM

## 2024-08-01 DIAGNOSIS — M33.13 DERMATOMYOSITIS (HCC): Primary | ICD-10-CM

## 2024-08-01 PROCEDURE — 99214 OFFICE O/P EST MOD 30 MIN: CPT | Performed by: PSYCHIATRY & NEUROLOGY

## 2024-08-01 RX ORDER — MAGNESIUM L-LACTATE 84 MG
84 TABLET, EXTENDED RELEASE ORAL DAILY
COMMUNITY

## 2024-08-01 RX ORDER — DIPHENOXYLATE HYDROCHLORIDE AND ATROPINE SULFATE 2.5; .025 MG/1; MG/1
1 TABLET ORAL DAILY
COMMUNITY

## 2024-08-01 RX ORDER — ZINC GLUCONATE 50 MG
50 TABLET ORAL DAILY
COMMUNITY

## 2024-08-01 NOTE — ASSESSMENT & PLAN NOTE
This patient is doing very well in regards to her symptoms from dermatomyositis, had a minor relapse in August 2023, when she lost her insurance and was not able to get her medications for almost a month.  She is currently on methotrexate 15 mg once a week, has noted significant improvement in her overall symptoms, does take folic acid 1 mg daily.  I did not recommend any change in the medications, if she continues to do well over the next 6 months, can consider lowering the dose back to her baseline dose of 10 mg each week.  Being on methotrexate, recommended to get blood work periodically for CBC and liver enzymes, she will get the next set of labs before her scheduled appointment in 6 months.    Follow-up in 6 months.

## 2024-08-01 NOTE — PROGRESS NOTES
Ambulatory Visit  Name: Ирина Rider      : 1990      MRN: [id number]  Encounter Provider: Hannah Souza MD  Encounter Date: 2024   Encounter department: NEUROLOGY [facility]    Assessment & Plan   1. Dermatomyositis (HCC)  Assessment & Plan:  This patient is doing very well in regards to her symptoms from dermatomyositis, had a minor relapse in 2023, when she lost her insurance and was not able to get her medications for almost a month.  She is currently on methotrexate 15 mg once a week, has noted significant improvement in her overall symptoms, does take folic acid 1 mg daily.  I did not recommend any change in the medications, if she continues to do well over the next 6 months, can consider lowering the dose back to her baseline dose of 10 mg each week.  Being on methotrexate, recommended to get blood work periodically for CBC and liver enzymes, she will get the next set of labs before her scheduled appointment in 6 months.    Follow-up in 6 months.   Orders:  -     CBC (Includes Diff/Plt) (Refl); Future  -     Comprehensive metabolic panel; Future  -     Sedimentation rate, automated; Future  -     Comprehensive metabolic panel  -     Sedimentation rate, automated  2. Long term (current) use of immunomodulator  -     CBC (Includes Diff/Plt) (Refl); Future  -     Comprehensive metabolic panel; Future  -     Sedimentation rate, automated; Future  -     Comprehensive metabolic panel  -     Sedimentation rate, automated      History of Present Illness     Ирина Rider is a 34 y.o. female who presents here for follow-up in neuromuscular clinic for dermatomyositis.  Patient was last evaluated by me In March, now returns for follow-up.    Patient was diagnosed with dermatomyositis in , with dermatological manifestations of the rash in malar region, around ankles, as well as muscle weakness in proximal muscles, has had muscle biopsy which was abnormal with  perifascicular atrophy and mild perimedial chronic inflammation.  On her last evaluation, she had a relapse of her symptoms, mostly because she lost her insurance and was not able to take her methotrexate for almost a month.  We had increased the dose of methotrexate on her last visit to 15 mg once a week, reports overall improvement in her symptoms.  At this time does not get arthralgias, denies any joint stiffness, does not report any muscle weakness in upper or lower extremities.  Does get very minimal rash when she is exposed to sun, but overall rash has been better as well.  Denies any side effects to the methotrexate, takes folic acid 1 mg daily, being on methotrexate be monitor her blood counts and liver enzymes, last labs were done in June 2024 and were normal.  Reports she has been doing a carnivore diet, which has helped with her arthralgia's.     No other complaints today.    I reviewed the below ROS and what is mentioned in HPI, the remainder of ROS was negative.  Review of Systems   Constitutional:  Negative for appetite change, fatigue and fever.   HENT: Negative.  Negative for hearing loss, tinnitus, trouble swallowing and voice change.    Eyes: Negative.  Negative for photophobia, pain and visual disturbance.   Respiratory: Negative.  Negative for shortness of breath.    Cardiovascular: Negative.  Negative for palpitations.   Gastrointestinal: Negative.  Negative for nausea and vomiting.   Endocrine: Negative.  Negative for cold intolerance.   Genitourinary: Negative.  Negative for dysuria, frequency and urgency.   Musculoskeletal:  Negative for back pain, gait problem, myalgias, neck pain and neck stiffness.   Skin: Negative.  Negative for rash.   Allergic/Immunologic: Negative.    Neurological: Negative.  Negative for dizziness, tremors, seizures, syncope, facial asymmetry, speech difficulty, weakness, light-headedness, numbness and headaches.   Hematological: Negative.  Does not bruise/bleed  "easily.   Psychiatric/Behavioral: Negative.  Negative for confusion, hallucinations and sleep disturbance.    All other systems reviewed and are negative.      Objective     /72 (BP Location: Left arm, Patient Position: Sitting, Cuff Size: Adult)   Pulse 62   Temp 98.2 °F (36.8 °C) (Temporal)   Ht 5' 5\" (1.651 m)   Wt 69.9 kg (154 lb)   SpO2 96%   BMI 25.63 kg/m²     Physical Exam  On general examination, patient was not in any acute distress, HEENT was unremarkable, extremities did not reveal any edema.  Very minimal rash was noted around the knuckles, no facial rash.    Neurologically, patient was awake and alert, speech was fluent without any dysarthria.  Cranial examination was normal, normal extraocular movements, normal strength of eye closure muscles, no facial asymmetry or weakness, tongue was midline.  On motor examination, strength was normal in the flexors, extensors, and all muscle groups in both upper and lower extremities, proximally and distally, including the toes.  Reflexes were 2+ throughout and normal, sensation was normal throughout all modalities.  Gait was casual and normal.  Patient did not need to brace in order to get up from a sitting position.      Administrative Statements           "

## 2024-08-13 LAB
ALBUMIN SERPL-MCNC: 4.8 G/DL (ref 3.9–4.9)
ALP SERPL-CCNC: 32 IU/L (ref 44–121)
ALT SERPL-CCNC: 14 IU/L (ref 0–32)
AST SERPL-CCNC: 20 IU/L (ref 0–40)
BILIRUB SERPL-MCNC: 1.6 MG/DL (ref 0–1.2)
BUN SERPL-MCNC: 18 MG/DL (ref 6–20)
BUN/CREAT SERPL: 27 (ref 9–23)
CALCIUM SERPL-MCNC: 9.6 MG/DL (ref 8.7–10.2)
CHLORIDE SERPL-SCNC: 102 MMOL/L (ref 96–106)
CO2 SERPL-SCNC: 21 MMOL/L (ref 20–29)
CREAT SERPL-MCNC: 0.67 MG/DL (ref 0.57–1)
EGFR: 116 ML/MIN/1.73
ERYTHROCYTE [SEDIMENTATION RATE] IN BLOOD BY WESTERGREN METHOD: 2 MM/HR (ref 0–32)
GLOBULIN SER-MCNC: 2.4 G/DL (ref 1.5–4.5)
GLUCOSE SERPL-MCNC: 84 MG/DL (ref 70–99)
POTASSIUM SERPL-SCNC: 4.6 MMOL/L (ref 3.5–5.2)
PROT SERPL-MCNC: 7.2 G/DL (ref 6–8.5)
SODIUM SERPL-SCNC: 139 MMOL/L (ref 134–144)

## 2025-05-08 ENCOUNTER — TELEMEDICINE (OUTPATIENT)
Dept: NEUROLOGY | Facility: CLINIC | Age: 35
End: 2025-05-08
Payer: COMMERCIAL

## 2025-05-08 DIAGNOSIS — M33.13 DERMATOMYOSITIS (HCC): Primary | ICD-10-CM

## 2025-05-08 DIAGNOSIS — Z79.61 LONG TERM (CURRENT) USE OF IMMUNOMODULATOR: ICD-10-CM

## 2025-05-08 PROCEDURE — 99213 OFFICE O/P EST LOW 20 MIN: CPT | Performed by: PSYCHIATRY & NEUROLOGY

## 2025-05-08 RX ORDER — METHOTREXATE 2.5 MG/1
15 TABLET ORAL WEEKLY
Qty: 24 TABLET | Refills: 3 | Status: SHIPPED | OUTPATIENT
Start: 2025-05-08

## 2025-05-08 NOTE — PROGRESS NOTES
Name: Ирина Rider      : 1990      MRN: [id number]  Encounter Provider: Hannah Souza MD  Encounter Date: 2025   Encounter department: NEUROLOGY [facility]  :  Assessment & Plan  Dermatomyositis (HCC)  This patient continues to do very well in regards to her symptoms from dermatomyositis, specifically does not have any muscle weakness, able to do all activities of daily living without any issues, and exercising on a regular basis.  Still gets rash periodically, has not been significant to consider changing her medications.  She will continue methotrexate 15 mg once a week, new refill was provided today, being on methotrexate on folate acid 1 mg daily.  We will continue to monitor her labs on a periodic basis, last blood work was in August and was normal, ordered repeat labs for CBC/CMP, CK and sed rate.    Orders:    methotrexate 2.5 MG tablet; Take 6 tablets (15 mg total) by mouth once a week    Sedimentation rate, automated; Standing    CBC (Includes Diff/Plt) (Refl); Standing    Comprehensive metabolic panel; Standing    Creatine Kinase, Total; Future    Long term (current) use of immunomodulator               History of Present Illness   HPI   I had the pleasure of seeing your patient in neurology clinic for neuromuscular follow-up.  As you know, this is a 35-year-old woman, with dermatomyositis, diagnosed in  she was last evaluated by me in 2024 and now returns for follow-up.  This visit was performed virtually.     Patient was diagnosed with dermatomyositis in , with dermatological manifestations of the rash in malar region, around ankles, as well as muscle weakness in proximal muscles, has had muscle biopsy which was abnormal with perifascicular atrophy and mild perimedial chronic inflammation.     Since last being seen, patient reports she has been overall stable in regards to her symptoms from dermatomyositis, denies any muscle weakness in upper or  lower extremities, no change in speech or swallowing, breathing continues to be normal.  She has transition to high-protein diet, which has been helpful, able to exercise and workout on a regular basis.  Does still get a facial rash and rash around her knuckles, which periodically gets worse, but overall has been stable.  She continues to take methotrexate 15 mg each week along with folic acid 1 mg daily, tolerating the medication well, denies any polyarthralgias myalgias or any other issues.  Being on methotrexate, we monitor her labs, last blood work was in August 2024, for sed rate, CBC and CMP which were all normal.      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     EMG July 2021, performed by Dr. Moore:  Abnormal study, consistent with a myopathic process affecting the proximal muscles without any evidence of membrane instability.  In addition, there was evidence to suggest a moderate carpal tunnel across the right wrist.  And a possible ulnar neuropathy across the elbow.     Patient has been followed by Rheumatology for dermatomyositis, was on steroids in the past.     Labs:  June 2021:  QuantiFERON indeterminate  Hepatitis-B, C were negative, HIV negative  CK normal 49, 55.  Striated muscle antibody, voltage gated calcium channel antibody, musk, acetyl choline receptor binding, blocking, and modulating antibodies were negative.    Myositis panel was negative from [place].  March 2021 TSH was negative, JUAN, rheumatoid factor were negative, CRP was normal, Lyme was negative.  Vitamin-D level was 21.9     MRI brain June 2021 with and without contrast normal study, images personally reviewed by me as a part of this evaluation.    l  Review of Systems   Constitutional:  Negative for appetite change, fatigue and fever.   HENT: Negative.  Negative for hearing loss, tinnitus, trouble swallowing  and voice change.    Eyes: Negative.  Negative for photophobia, pain and visual disturbance.   Respiratory: Negative.  Negative for shortness of breath.    Cardiovascular: Negative.  Negative for palpitations.   Gastrointestinal: Negative.  Negative for nausea and vomiting.   Endocrine: Negative.  Negative for cold intolerance.   Genitourinary: Negative.  Negative for dysuria, frequency and urgency.   Musculoskeletal:  Negative for back pain, gait problem, myalgias, neck pain and neck stiffness.   Skin: Negative.  Negative for rash.   Allergic/Immunologic: Negative.    Neurological:  Negative for dizziness, tremors, seizures, syncope, facial asymmetry, speech difficulty, weakness, light-headedness, numbness and headaches.   Hematological: Negative.  Does not bruise/bleed easily.   Psychiatric/Behavioral: Negative.  Negative for confusion, hallucinations and sleep disturbance.    All other systems reviewed and are negative.   I have personally reviewed the MA's review of systems and made changes as necessary.         Objective   There were no vitals taken for this visit.    Physical Exam  A complete exam could not be performed as this was a telemedicine visit.  Patient was awake and alert, oriented to time place and person.  Speech was fluent without any dysarthria.  Cranial examination appeared normal, no facial asymmetry, patient had very minimal redness at her nasal bridge, as well as around her knuckles in both hands.  Moving all 4 extremities without any issues.          Administrative Statements   Encounter provider Hannah Souza MD    The Patient is located at Other and in the following state in which I hold an active license [place].    The patient was identified by name and date of birth. Ирина Rider was informed that this is a telemedicine visit and that the visit is being conducted through the Epic Embedded platform. She agrees to proceed..  My office door was closed. No one else was in the room.  She  acknowledged consent and understanding of privacy and security of the video platform. The patient has agreed to participate and understands they can discontinue the visit at any time.    I have spent a total time of 20 minutes in caring for this patient on the day of the visit/encounter including Diagnostic results, Documenting in the medical record, Reviewing/placing orders in the medical record (including tests, medications, and/or procedures), and Obtaining or reviewing history  , not including the time spent for establishing the audio/video connection.

## 2025-05-08 NOTE — ASSESSMENT & PLAN NOTE
This patient continues to do very well in regards to her symptoms from dermatomyositis, specifically does not have any muscle weakness, able to do all activities of daily living without any issues, and exercising on a regular basis.  Still gets rash periodically, has not been significant to consider changing her medications.  She will continue methotrexate 15 mg once a week, new refill was provided today, being on methotrexate on folate acid 1 mg daily.  We will continue to monitor her labs on a periodic basis, last blood work was in August and was normal, ordered repeat labs for CBC/CMP, CK and sed rate.    Orders:    methotrexate 2.5 MG tablet; Take 6 tablets (15 mg total) by mouth once a week    Sedimentation rate, automated; Standing    CBC (Includes Diff/Plt) (Refl); Standing    Comprehensive metabolic panel; Standing    Creatine Kinase, Total; Future

## 2025-08-19 ENCOUNTER — OFFICE VISIT (OUTPATIENT)
Dept: INTERNAL MEDICINE CLINIC | Facility: CLINIC | Age: 35
End: 2025-08-19
Payer: COMMERCIAL

## 2025-08-19 VITALS
HEIGHT: 65 IN | HEART RATE: 70 BPM | WEIGHT: 148 LBS | SYSTOLIC BLOOD PRESSURE: 110 MMHG | DIASTOLIC BLOOD PRESSURE: 60 MMHG | OXYGEN SATURATION: 98 % | BODY MASS INDEX: 24.66 KG/M2

## 2025-08-19 DIAGNOSIS — M33.13 DERMATOMYOSITIS (HCC): ICD-10-CM

## 2025-08-19 DIAGNOSIS — Z11.1 SCREENING FOR TUBERCULOSIS: ICD-10-CM

## 2025-08-19 DIAGNOSIS — Z00.00 ANNUAL PHYSICAL EXAM: Primary | ICD-10-CM

## 2025-08-19 DIAGNOSIS — Z13.6 SCREENING FOR HEART DISEASE: ICD-10-CM

## 2025-08-19 DIAGNOSIS — Z13.1 SCREENING FOR DIABETES MELLITUS: ICD-10-CM

## 2025-08-19 PROCEDURE — 99385 PREV VISIT NEW AGE 18-39: CPT

## 2025-08-19 PROCEDURE — 86580 TB INTRADERMAL TEST: CPT

## 2025-08-21 ENCOUNTER — CLINICAL SUPPORT (OUTPATIENT)
Dept: INTERNAL MEDICINE CLINIC | Facility: CLINIC | Age: 35
End: 2025-08-21

## 2025-08-21 DIAGNOSIS — Z11.1 ENCOUNTER FOR PPD SKIN TEST READING: Primary | ICD-10-CM

## 2025-08-21 LAB
INDURATION: 0 MM
TB SKIN TEST: NEGATIVE

## 2025-08-21 PROCEDURE — NURSE

## (undated) DEVICE — INTENDED FOR TISSUE SEPARATION, AND OTHER PROCEDURES THAT REQUIRE A SHARP SURGICAL BLADE TO PUNCTURE OR CUT.: Brand: BARD-PARKER ® CARBON RIB-BACK BLADES

## (undated) DEVICE — NEEDLE 25G X 1 1/2

## (undated) DEVICE — SUT VICRYL 3-0 REEL 54 IN J285G

## (undated) DEVICE — 3M™ STERI-STRIP™ REINFORCED ADHESIVE SKIN CLOSURES, R1547, 1/2 IN X 4 IN (12 MM X 100 MM), 6 STRIPS/ENVELOPE: Brand: 3M™ STERI-STRIP™

## (undated) DEVICE — GLOVE INDICATOR PI UNDERGLOVE SZ 8 BLUE

## (undated) DEVICE — SUT VICRYL 3-0 SH 27 IN J416H

## (undated) DEVICE — DISPOSABLE EQUIPMENT COVER: Brand: SMALL TOWEL DRAPE

## (undated) DEVICE — OCCLUSIVE GAUZE STRIP,3% BISMUTH TRIBROMOPHENATE IN PETROLATUM BLEND: Brand: XEROFORM

## (undated) DEVICE — GAUZE SPONGES,16 PLY: Brand: CURITY

## (undated) DEVICE — CHLORAPREP HI-LITE 26ML ORANGE

## (undated) DEVICE — STERILE BETHLEHEM PLASTIC HAND: Brand: CARDINAL HEALTH

## (undated) DEVICE — PADDING CAST 4 IN  COTTON STRL

## (undated) DEVICE — GLOVE SRG BIOGEL 7

## (undated) DEVICE — BANDAGE ACE VELCRO 4 IN LF

## (undated) DEVICE — MINI BLADE ROUND TIP SHARP ON ONE SIDE

## (undated) DEVICE — ACE WRAP 4 IN UNSTERILE

## (undated) DEVICE — CUFF TOURNIQUET 18 X 4 IN QUICK CONNECT DISP 1 BLADDER

## (undated) DEVICE — SUT PROLENE 4-0 PS-2 18 IN 8682G

## (undated) DEVICE — BETHLEHEM UNIVERSAL MINOR GEN: Brand: CARDINAL HEALTH

## (undated) DEVICE — SUT MONOCRYL 4-0 PS-2 18 IN Y496G

## (undated) DEVICE — TELFA NON-ADHERENT ABSORBENT DRESSING: Brand: TELFA

## (undated) DEVICE — GLOVE SRG BIOGEL ECLIPSE 7

## (undated) DEVICE — PLUMEPEN PRO 10FT

## (undated) DEVICE — SPONGE PVP SCRUB WING STERILE

## (undated) DEVICE — PACK C-SECTION PBDS

## (undated) DEVICE — PADDING CAST 2IN COTTON STRL

## (undated) DEVICE — Device

## (undated) DEVICE — GLOVE INDICATOR PI UNDERGLOVE SZ 7 BLUE

## (undated) DEVICE — SUT VICRYL 0 CTX 36 IN J978H

## (undated) DEVICE — UNDYED BRAIDED (POLYGLACTIN 910), SYNTHETIC ABSORBABLE SUTURE: Brand: COATED VICRYL

## (undated) DEVICE — KNIFE LIGHT 10,PK: Brand: KNIFELIGHT

## (undated) DEVICE — ADHESIVE SKIN HIGH VISCOSITY EXOFIN 1ML

## (undated) DEVICE — 3M™ STERI-STRIP™ REINFORCED ADHESIVE SKIN CLOSURES, R1542, 1/4 IN X 1-1/2 IN (6 MM X 38 MM), 6 STRIPS/ENVELOPE: Brand: 3M™ STERI-STRIP™

## (undated) DEVICE — SUT MONOCRYL 3-0 PS-2 18 IN Y497G

## (undated) DEVICE — INTENDED FOR TISSUE SEPARATION, AND OTHER PROCEDURES THAT REQUIRE A SHARP SURGICAL BLADE TO PUNCTURE OR CUT.: Brand: BARD-PARKER SAFETY BLADES SIZE 15, STERILE

## (undated) DEVICE — ARM SLING: Brand: DEROYAL

## (undated) DEVICE — GLOVE SRG BIOGEL ECLIPSE 7.5

## (undated) DEVICE — PROXIMATE PLUS MD MULTI-DIRECTIONAL RELEASE SKIN STAPLERS CONTAINS 35 STAINLESS STEEL STAPLES APPROXIMATE CLOSED DIMENSIONS: 6.9MM X 3.9MM WIDE: Brand: PROXIMATE

## (undated) DEVICE — SKIN MARKER DUAL TIP WITH RULER CAP, FLEXIBLE RULER AND LABELS: Brand: DEVON

## (undated) DEVICE — NEEDLE 22 G X 1 1/2 SAFETY

## (undated) DEVICE — ABDOMINAL PAD: Brand: DERMACEA